# Patient Record
Sex: MALE | Race: WHITE | Employment: FULL TIME
[De-identification: names, ages, dates, MRNs, and addresses within clinical notes are randomized per-mention and may not be internally consistent; named-entity substitution may affect disease eponyms.]

---

## 2017-01-03 ENCOUNTER — OFFICE VISIT (OUTPATIENT)
Facility: CLINIC | Age: 52
End: 2017-01-03

## 2017-01-03 VITALS
DIASTOLIC BLOOD PRESSURE: 76 MMHG | HEIGHT: 72 IN | TEMPERATURE: 98.1 F | OXYGEN SATURATION: 98 % | SYSTOLIC BLOOD PRESSURE: 110 MMHG | BODY MASS INDEX: 42.66 KG/M2 | WEIGHT: 315 LBS | HEART RATE: 74 BPM

## 2017-01-03 DIAGNOSIS — J32.0 MAXILLARY SINUSITIS, UNSPECIFIED CHRONICITY: Primary | ICD-10-CM

## 2017-01-03 PROCEDURE — 99213 OFFICE O/P EST LOW 20 MIN: CPT | Performed by: PHYSICIAN ASSISTANT

## 2017-01-03 RX ORDER — AZITHROMYCIN 250 MG/1
TABLET, FILM COATED ORAL
Qty: 1 PACKET | Refills: 0 | Status: SHIPPED | OUTPATIENT
Start: 2017-01-03 | End: 2017-01-13

## 2017-01-03 RX ORDER — MOMETASONE FUROATE 50 UG/1
2 SPRAY, METERED NASAL DAILY
Qty: 1 INHALER | Refills: 0 | Status: SHIPPED | OUTPATIENT
Start: 2017-01-03 | End: 2017-07-07

## 2017-01-03 ASSESSMENT — ENCOUNTER SYMPTOMS
CHEST TIGHTNESS: 0
SORE THROAT: 1
DIARRHEA: 0
VOMITING: 0
COUGH: 1
NAUSEA: 0
SINUS PRESSURE: 1
SHORTNESS OF BREATH: 0

## 2017-02-03 DIAGNOSIS — E78.5 DYSLIPIDEMIA: ICD-10-CM

## 2017-02-03 RX ORDER — ATORVASTATIN CALCIUM 40 MG/1
TABLET, FILM COATED ORAL
Qty: 30 TABLET | Refills: 0 | Status: SHIPPED | OUTPATIENT
Start: 2017-02-03 | End: 2017-03-09 | Stop reason: SDUPTHER

## 2017-02-03 RX ORDER — LISINOPRIL AND HYDROCHLOROTHIAZIDE 12.5; 1 MG/1; MG/1
TABLET ORAL
Qty: 30 TABLET | Refills: 0 | Status: SHIPPED | OUTPATIENT
Start: 2017-02-03 | End: 2017-03-09 | Stop reason: SDUPTHER

## 2017-02-03 RX ORDER — PANTOPRAZOLE SODIUM 40 MG/1
TABLET, DELAYED RELEASE ORAL
Qty: 30 TABLET | Refills: 0 | Status: SHIPPED | OUTPATIENT
Start: 2017-02-03 | End: 2017-03-09 | Stop reason: SDUPTHER

## 2017-03-09 ENCOUNTER — TELEPHONE (OUTPATIENT)
Facility: CLINIC | Age: 52
End: 2017-03-09

## 2017-03-09 DIAGNOSIS — L91.8 SKIN TAG: Primary | ICD-10-CM

## 2017-03-09 DIAGNOSIS — E78.5 DYSLIPIDEMIA: ICD-10-CM

## 2017-03-09 RX ORDER — ATORVASTATIN CALCIUM 40 MG/1
TABLET, FILM COATED ORAL
Qty: 30 TABLET | Refills: 0 | Status: SHIPPED | OUTPATIENT
Start: 2017-03-09 | End: 2017-04-14 | Stop reason: SDUPTHER

## 2017-03-09 RX ORDER — LISINOPRIL AND HYDROCHLOROTHIAZIDE 12.5; 1 MG/1; MG/1
TABLET ORAL
Qty: 30 TABLET | Refills: 0 | Status: SHIPPED | OUTPATIENT
Start: 2017-03-09 | End: 2017-04-14 | Stop reason: SDUPTHER

## 2017-03-09 RX ORDER — PANTOPRAZOLE SODIUM 40 MG/1
TABLET, DELAYED RELEASE ORAL
Qty: 30 TABLET | Refills: 0 | Status: SHIPPED | OUTPATIENT
Start: 2017-03-09 | End: 2017-04-14 | Stop reason: SDUPTHER

## 2017-04-14 DIAGNOSIS — E78.5 DYSLIPIDEMIA: ICD-10-CM

## 2017-04-17 RX ORDER — LISINOPRIL AND HYDROCHLOROTHIAZIDE 12.5; 1 MG/1; MG/1
TABLET ORAL
Qty: 30 TABLET | Refills: 0 | Status: SHIPPED | OUTPATIENT
Start: 2017-04-17 | End: 2017-05-18 | Stop reason: SDUPTHER

## 2017-04-17 RX ORDER — ATORVASTATIN CALCIUM 40 MG/1
TABLET, FILM COATED ORAL
Qty: 30 TABLET | Refills: 0 | Status: SHIPPED | OUTPATIENT
Start: 2017-04-17 | End: 2017-05-18 | Stop reason: SDUPTHER

## 2017-04-17 RX ORDER — PANTOPRAZOLE SODIUM 40 MG/1
TABLET, DELAYED RELEASE ORAL
Qty: 30 TABLET | Refills: 0 | Status: SHIPPED | OUTPATIENT
Start: 2017-04-17 | End: 2017-05-18 | Stop reason: SDUPTHER

## 2017-05-18 DIAGNOSIS — E78.5 DYSLIPIDEMIA: ICD-10-CM

## 2017-05-19 RX ORDER — LISINOPRIL AND HYDROCHLOROTHIAZIDE 12.5; 1 MG/1; MG/1
TABLET ORAL
Qty: 30 TABLET | Refills: 0 | Status: SHIPPED | OUTPATIENT
Start: 2017-05-19 | End: 2017-06-23 | Stop reason: SDUPTHER

## 2017-05-19 RX ORDER — ATORVASTATIN CALCIUM 40 MG/1
TABLET, FILM COATED ORAL
Qty: 30 TABLET | Refills: 0 | Status: SHIPPED | OUTPATIENT
Start: 2017-05-19 | End: 2017-06-23 | Stop reason: SDUPTHER

## 2017-05-19 RX ORDER — PANTOPRAZOLE SODIUM 40 MG/1
TABLET, DELAYED RELEASE ORAL
Qty: 30 TABLET | Refills: 0 | Status: SHIPPED | OUTPATIENT
Start: 2017-05-19 | End: 2017-06-23 | Stop reason: SDUPTHER

## 2017-08-04 ENCOUNTER — OFFICE VISIT (OUTPATIENT)
Dept: PODIATRY | Age: 52
End: 2017-08-04
Payer: COMMERCIAL

## 2017-08-04 ENCOUNTER — HOSPITAL ENCOUNTER (OUTPATIENT)
Age: 52
Setting detail: SPECIMEN
Discharge: HOME | End: 2017-08-04
Payer: COMMERCIAL

## 2017-08-04 VITALS
SYSTOLIC BLOOD PRESSURE: 120 MMHG | HEIGHT: 73 IN | HEART RATE: 65 BPM | DIASTOLIC BLOOD PRESSURE: 76 MMHG | WEIGHT: 300 LBS | BODY MASS INDEX: 39.76 KG/M2

## 2017-08-04 DIAGNOSIS — R73.9 ELEVATED BLOOD SUGAR: ICD-10-CM

## 2017-08-04 DIAGNOSIS — M21.70 LOWER LIMB LENGTH DIFFERENCE: ICD-10-CM

## 2017-08-04 DIAGNOSIS — M79.672 LEFT FOOT PAIN: ICD-10-CM

## 2017-08-04 DIAGNOSIS — E55.9 VITAMIN D DEFICIENCY: ICD-10-CM

## 2017-08-04 DIAGNOSIS — Z12.5 PROSTATE CANCER SCREENING: ICD-10-CM

## 2017-08-04 DIAGNOSIS — E78.5 DYSLIPIDEMIA: ICD-10-CM

## 2017-08-04 DIAGNOSIS — M19.072 PRIMARY OSTEOARTHRITIS OF LEFT FOOT: Primary | ICD-10-CM

## 2017-08-04 LAB
ALBUMIN SERPL-MCNC: 4.2 G/DL (ref 3.5–5.2)
ALBUMIN/GLOBULIN RATIO: 1.4 (ref 1–2.5)
ALP BLD-CCNC: 100 U/L (ref 40–129)
ALT SERPL-CCNC: 17 U/L (ref 5–41)
ANION GAP SERPL CALCULATED.3IONS-SCNC: 16 MMOL/L (ref 9–17)
AST SERPL-CCNC: 24 U/L
BILIRUB SERPL-MCNC: 0.48 MG/DL (ref 0.3–1.2)
BUN BLDV-MCNC: 16 MG/DL (ref 6–20)
BUN/CREAT BLD: ABNORMAL (ref 9–20)
CALCIUM SERPL-MCNC: 9 MG/DL (ref 8.6–10.4)
CHLORIDE BLD-SCNC: 104 MMOL/L (ref 98–107)
CHOLESTEROL/HDL RATIO: 5
CHOLESTEROL: 205 MG/DL
CO2: 25 MMOL/L (ref 20–31)
CREAT SERPL-MCNC: 0.82 MG/DL (ref 0.7–1.2)
ESTIMATED AVERAGE GLUCOSE: 123 MG/DL
GFR AFRICAN AMERICAN: >60 ML/MIN
GFR NON-AFRICAN AMERICAN: >60 ML/MIN
GFR SERPL CREATININE-BSD FRML MDRD: ABNORMAL ML/MIN/{1.73_M2}
GFR SERPL CREATININE-BSD FRML MDRD: ABNORMAL ML/MIN/{1.73_M2}
GLUCOSE BLD-MCNC: 109 MG/DL (ref 70–99)
HBA1C MFR BLD: 5.9 % (ref 4–6)
HCT VFR BLD CALC: 46.2 % (ref 41–53)
HDLC SERPL-MCNC: 41 MG/DL
HEMOGLOBIN: 15.7 G/DL (ref 13.5–17.5)
LDL CHOLESTEROL: 135 MG/DL (ref 0–130)
MCH RBC QN AUTO: 30.6 PG (ref 26–34)
MCHC RBC AUTO-ENTMCNC: 34 G/DL (ref 31–37)
MCV RBC AUTO: 90.1 FL (ref 80–100)
PDW BLD-RTO: 14.2 % (ref 12.5–15.4)
PLATELET # BLD: 272 K/UL (ref 140–450)
PMV BLD AUTO: 9.3 FL (ref 6–12)
POTASSIUM SERPL-SCNC: 4.4 MMOL/L (ref 3.7–5.3)
PROSTATE SPECIFIC ANTIGEN: 1.16 UG/L
RBC # BLD: 5.13 M/UL (ref 4.5–5.9)
SODIUM BLD-SCNC: 145 MMOL/L (ref 135–144)
TOTAL PROTEIN: 7.2 G/DL (ref 6.4–8.3)
TRIGL SERPL-MCNC: 144 MG/DL
VITAMIN D 25-HYDROXY: 18.3 NG/ML (ref 30–100)
VLDLC SERPL CALC-MCNC: ABNORMAL MG/DL (ref 1–30)
WBC # BLD: 6.5 K/UL (ref 3.5–11)

## 2017-08-04 PROCEDURE — 99203 OFFICE O/P NEW LOW 30 MIN: CPT | Performed by: PODIATRIST

## 2017-08-04 PROCEDURE — 73630 X-RAY EXAM OF FOOT: CPT | Performed by: PODIATRIST

## 2017-08-04 ASSESSMENT — ENCOUNTER SYMPTOMS
COLOR CHANGE: 0
BACK PAIN: 0
DIARRHEA: 0
SHORTNESS OF BREATH: 0
NAUSEA: 0

## 2017-08-25 ENCOUNTER — HOSPITAL ENCOUNTER (OUTPATIENT)
Age: 52
Setting detail: OUTPATIENT SURGERY
Discharge: HOME | End: 2017-08-25
Attending: SURGERY | Admitting: SURGERY
Payer: COMMERCIAL

## 2017-08-25 VITALS
SYSTOLIC BLOOD PRESSURE: 130 MMHG | BODY MASS INDEX: 39.76 KG/M2 | RESPIRATION RATE: 16 BRPM | HEIGHT: 73 IN | OXYGEN SATURATION: 100 % | WEIGHT: 300 LBS | TEMPERATURE: 97.6 F | HEART RATE: 58 BPM | DIASTOLIC BLOOD PRESSURE: 80 MMHG

## 2017-08-25 PROCEDURE — 7100000011 HC PHASE II RECOVERY - ADDTL 15 MIN: Performed by: SURGERY

## 2017-08-25 PROCEDURE — 6360000002 HC RX W HCPCS: Performed by: SURGERY

## 2017-08-25 PROCEDURE — 99152 MOD SED SAME PHYS/QHP 5/>YRS: CPT | Performed by: SURGERY

## 2017-08-25 PROCEDURE — C1773 RET DEV, INSERTABLE: HCPCS | Performed by: SURGERY

## 2017-08-25 PROCEDURE — 3609010600 HC COLONOSCOPY POLYPECTOMY SNARE/COLD BIOPSY: Performed by: SURGERY

## 2017-08-25 PROCEDURE — 88305 TISSUE EXAM BY PATHOLOGIST: CPT

## 2017-08-25 PROCEDURE — 99153 MOD SED SAME PHYS/QHP EA: CPT | Performed by: SURGERY

## 2017-08-25 PROCEDURE — 2580000003 HC RX 258: Performed by: SURGERY

## 2017-08-25 PROCEDURE — 7100000010 HC PHASE II RECOVERY - FIRST 15 MIN: Performed by: SURGERY

## 2017-08-25 RX ORDER — FENTANYL CITRATE 50 UG/ML
INJECTION, SOLUTION INTRAMUSCULAR; INTRAVENOUS PRN
Status: DISCONTINUED | OUTPATIENT
Start: 2017-08-25 | End: 2017-08-25 | Stop reason: HOSPADM

## 2017-08-25 RX ORDER — MIDAZOLAM HYDROCHLORIDE 1 MG/ML
INJECTION INTRAMUSCULAR; INTRAVENOUS PRN
Status: DISCONTINUED | OUTPATIENT
Start: 2017-08-25 | End: 2017-08-25 | Stop reason: HOSPADM

## 2017-08-25 RX ORDER — SODIUM CHLORIDE, SODIUM LACTATE, POTASSIUM CHLORIDE, CALCIUM CHLORIDE 600; 310; 30; 20 MG/100ML; MG/100ML; MG/100ML; MG/100ML
INJECTION, SOLUTION INTRAVENOUS CONTINUOUS
Status: DISCONTINUED | OUTPATIENT
Start: 2017-08-25 | End: 2017-08-25 | Stop reason: HOSPADM

## 2017-08-25 RX ADMIN — SODIUM CHLORIDE, POTASSIUM CHLORIDE, SODIUM LACTATE AND CALCIUM CHLORIDE: 600; 310; 30; 20 INJECTION, SOLUTION INTRAVENOUS at 08:22

## 2017-08-25 ASSESSMENT — PAIN SCALES - GENERAL
PAINLEVEL_OUTOF10: 0
PAINLEVEL_OUTOF10: 0

## 2017-08-25 ASSESSMENT — PAIN - FUNCTIONAL ASSESSMENT: PAIN_FUNCTIONAL_ASSESSMENT: 0-10

## 2017-08-28 LAB — SURGICAL PATHOLOGY REPORT: NORMAL

## 2018-06-29 ENCOUNTER — HOSPITAL ENCOUNTER (OUTPATIENT)
Facility: CLINIC | Age: 53
Discharge: HOME | End: 2018-07-01
Payer: COMMERCIAL

## 2018-06-29 ENCOUNTER — HOSPITAL ENCOUNTER (OUTPATIENT)
Dept: GENERAL RADIOLOGY | Facility: CLINIC | Age: 53
Discharge: HOME | End: 2018-07-01
Payer: COMMERCIAL

## 2018-06-29 DIAGNOSIS — J40 BRONCHITIS: ICD-10-CM

## 2018-06-29 PROCEDURE — 71046 X-RAY EXAM CHEST 2 VIEWS: CPT

## 2018-12-07 ENCOUNTER — HOSPITAL ENCOUNTER (OUTPATIENT)
Age: 53
Setting detail: SPECIMEN
Discharge: HOME | End: 2018-12-07
Payer: COMMERCIAL

## 2018-12-07 DIAGNOSIS — Z12.5 PROSTATE CANCER SCREENING: ICD-10-CM

## 2018-12-07 DIAGNOSIS — I10 ESSENTIAL HYPERTENSION: ICD-10-CM

## 2018-12-07 DIAGNOSIS — E78.00 PURE HYPERCHOLESTEROLEMIA: ICD-10-CM

## 2018-12-07 DIAGNOSIS — E66.01 OBESITY, CLASS III, BMI 40-49.9 (MORBID OBESITY) (HCC): ICD-10-CM

## 2018-12-07 DIAGNOSIS — F41.9 ANXIETY: ICD-10-CM

## 2018-12-07 LAB
ABSOLUTE EOS #: 0.19 K/UL (ref 0–0.44)
ABSOLUTE IMMATURE GRANULOCYTE: 0.03 K/UL (ref 0–0.3)
ABSOLUTE LYMPH #: 2.23 K/UL (ref 1.1–3.7)
ABSOLUTE MONO #: 0.69 K/UL (ref 0.1–1.2)
ALBUMIN SERPL-MCNC: 3.8 G/DL (ref 3.5–5.2)
ALBUMIN/GLOBULIN RATIO: 1.3 (ref 1–2.5)
ALP BLD-CCNC: 103 U/L (ref 40–129)
ALT SERPL-CCNC: 28 U/L (ref 5–41)
ANION GAP SERPL CALCULATED.3IONS-SCNC: 10 MMOL/L (ref 9–17)
AST SERPL-CCNC: 28 U/L
BASOPHILS # BLD: 1 % (ref 0–2)
BASOPHILS ABSOLUTE: 0.04 K/UL (ref 0–0.2)
BILIRUB SERPL-MCNC: 0.33 MG/DL (ref 0.3–1.2)
BUN BLDV-MCNC: 23 MG/DL (ref 6–20)
BUN/CREAT BLD: ABNORMAL (ref 9–20)
CALCIUM SERPL-MCNC: 8.4 MG/DL (ref 8.6–10.4)
CHLORIDE BLD-SCNC: 108 MMOL/L (ref 98–107)
CHOLESTEROL/HDL RATIO: 3
CHOLESTEROL: 142 MG/DL
CO2: 24 MMOL/L (ref 20–31)
CREAT SERPL-MCNC: 0.82 MG/DL (ref 0.7–1.2)
DIFFERENTIAL TYPE: NORMAL
EOSINOPHILS RELATIVE PERCENT: 3 % (ref 1–4)
GFR AFRICAN AMERICAN: >60 ML/MIN
GFR NON-AFRICAN AMERICAN: >60 ML/MIN
GFR SERPL CREATININE-BSD FRML MDRD: ABNORMAL ML/MIN/{1.73_M2}
GFR SERPL CREATININE-BSD FRML MDRD: ABNORMAL ML/MIN/{1.73_M2}
GLUCOSE BLD-MCNC: 119 MG/DL (ref 70–99)
HCT VFR BLD CALC: 46.3 % (ref 40.7–50.3)
HDLC SERPL-MCNC: 48 MG/DL
HEMOGLOBIN: 15.2 G/DL (ref 13–17)
IMMATURE GRANULOCYTES: 0 %
LDL CHOLESTEROL: 80 MG/DL (ref 0–130)
LYMPHOCYTES # BLD: 32 % (ref 24–43)
MCH RBC QN AUTO: 30.7 PG (ref 25.2–33.5)
MCHC RBC AUTO-ENTMCNC: 32.8 G/DL (ref 28.4–34.8)
MCV RBC AUTO: 93.5 FL (ref 82.6–102.9)
MONOCYTES # BLD: 10 % (ref 3–12)
NRBC AUTOMATED: 0 PER 100 WBC
PDW BLD-RTO: 13.3 % (ref 11.8–14.4)
PLATELET # BLD: 311 K/UL (ref 138–453)
PLATELET ESTIMATE: NORMAL
PMV BLD AUTO: 10.8 FL (ref 8.1–13.5)
POTASSIUM SERPL-SCNC: 4.3 MMOL/L (ref 3.7–5.3)
PROSTATE SPECIFIC ANTIGEN: 0.9 UG/L
RBC # BLD: 4.95 M/UL (ref 4.21–5.77)
RBC # BLD: NORMAL 10*6/UL
SEG NEUTROPHILS: 54 % (ref 36–65)
SEGMENTED NEUTROPHILS ABSOLUTE COUNT: 3.9 K/UL (ref 1.5–8.1)
SODIUM BLD-SCNC: 142 MMOL/L (ref 135–144)
TOTAL PROTEIN: 6.7 G/DL (ref 6.4–8.3)
TRIGL SERPL-MCNC: 72 MG/DL
VLDLC SERPL CALC-MCNC: NORMAL MG/DL (ref 1–30)
WBC # BLD: 7.1 K/UL (ref 3.5–11.3)
WBC # BLD: NORMAL 10*3/UL

## 2018-12-28 ENCOUNTER — OFFICE VISIT (OUTPATIENT)
Dept: FAMILY MEDICINE CLINIC | Age: 53
End: 2018-12-28
Payer: COMMERCIAL

## 2018-12-28 VITALS
TEMPERATURE: 98.3 F | DIASTOLIC BLOOD PRESSURE: 82 MMHG | SYSTOLIC BLOOD PRESSURE: 138 MMHG | HEIGHT: 71 IN | HEART RATE: 77 BPM | BODY MASS INDEX: 44.1 KG/M2 | RESPIRATION RATE: 16 BRPM | OXYGEN SATURATION: 95 % | WEIGHT: 315 LBS

## 2018-12-28 DIAGNOSIS — R09.89 CHEST CONGESTION: Primary | ICD-10-CM

## 2018-12-28 DIAGNOSIS — J01.00 ACUTE NON-RECURRENT MAXILLARY SINUSITIS: ICD-10-CM

## 2018-12-28 PROCEDURE — 99213 OFFICE O/P EST LOW 20 MIN: CPT | Performed by: FAMILY MEDICINE

## 2018-12-28 RX ORDER — PREDNISONE 50 MG/1
50 TABLET ORAL DAILY
Qty: 7 TABLET | Refills: 0 | Status: SHIPPED | OUTPATIENT
Start: 2018-12-28 | End: 2019-01-04

## 2018-12-28 RX ORDER — AZITHROMYCIN 250 MG/1
TABLET, FILM COATED ORAL
Qty: 1 PACKET | Refills: 0 | Status: SHIPPED | OUTPATIENT
Start: 2018-12-28 | End: 2019-11-05 | Stop reason: CLARIF

## 2018-12-28 RX ORDER — BROMPHENIRAMINE MALEATE, PSEUDOEPHEDRINE HYDROCHLORIDE, AND DEXTROMETHORPHAN HYDROBROMIDE 2; 30; 10 MG/5ML; MG/5ML; MG/5ML
5 SYRUP ORAL 4 TIMES DAILY PRN
Qty: 180 ML | Refills: 2 | Status: SHIPPED | OUTPATIENT
Start: 2018-12-28 | End: 2019-11-05 | Stop reason: CLARIF

## 2018-12-28 ASSESSMENT — ENCOUNTER SYMPTOMS
ALLERGIC/IMMUNOLOGIC NEGATIVE: 1
SINUS PRESSURE: 1
SINUS PAIN: 1
GASTROINTESTINAL NEGATIVE: 1
WHEEZING: 1
EYES NEGATIVE: 1
COUGH: 1
RHINORRHEA: 1

## 2018-12-28 NOTE — PROGRESS NOTES
[id number] WALK-IN FAMILY [facility]ICINE   [address] [facility]  [address] [facility] [address]  Dept: [phone]  Dept Fax: [phone]    Shaji Mauricio is a 48 y.o. male who presents today for his medical conditions/complaintsas noted below. Shaji Mauricio is c/o of   Chief Complaint   Patient presents with    Pharyngitis    Headache     x 1.5 weeks    Otalgia         HPI:     Patient is a 63-year-old white male who presents today for cough, congestion, and upper respiratory tract symptoms. Patient also complains of occasional headache with bilateral ear pain and discomfort. Patient also feels pressure between his ears and behind eyes. He denies any fevers or chills at this time. Patient also has heavy wheezing along with sore throat. Patient's symptoms has been going on for the past 10-15 days. We will evaluate and treat.         Hemoglobin A1C (%)   Date Value   08/04/2017 5.9   02/03/2013 5.7             ( goal A1Cis < 7)   No results found for: LABMICR  LDL Cholesterol (mg/dL)   Date Value   12/07/2018 80   08/04/2017 135 (H)   10/23/2015 167 (H)       (goal LDL is <100)   AST (U/L)   Date Value   12/07/2018 28     ALT (U/L)   Date Value   12/07/2018 28     BUN (mg/dL)   Date Value   12/07/2018 23 (H)     BP Readings from Last 3 Encounters:   12/28/18 (!) 147/85   12/04/18 122/80   10/19/18 130/78          (goal 120/80)    Past Medical History:   Diagnosis Date    Anxiety 6/3/2015    GERD (gastroesophageal reflux disease) 6/3/2015    Hyperlipidemia     Hypertension     Obesity, Class III, BMI 40-49.9 (morbid obesity) ([facility] [address] 6/3/2015    Obstructive sleep apnea     uses cpap at home      Past Surgical History:   Procedure Laterality Date    APPENDECTOMY      COLONOSCOPY  08/25/2017    multiple polypectomy    [place] COLSC FLX W/RMVL OF TUMOR POLYP LESION SNARE TQ N/A 8/25/2017    COLONOSCOPY POLYPECTOMY SNARE/COLD BIOPSY performed by Carmen Thornton MD at [address]

## 2020-01-24 ENCOUNTER — HOSPITAL ENCOUNTER (OUTPATIENT)
Dept: GENERAL RADIOLOGY | Facility: CLINIC | Age: 55
Discharge: HOME | End: 2020-01-26
Payer: COMMERCIAL

## 2020-01-24 ENCOUNTER — HOSPITAL ENCOUNTER (OUTPATIENT)
Facility: CLINIC | Age: 55
Discharge: HOME | End: 2020-01-26
Payer: COMMERCIAL

## 2020-01-24 PROCEDURE — 71046 X-RAY EXAM CHEST 2 VIEWS: CPT

## 2022-06-08 ENCOUNTER — HOSPITAL ENCOUNTER (OUTPATIENT)
Age: 57
Setting detail: SPECIMEN
Discharge: HOME | End: 2022-06-08

## 2022-06-08 DIAGNOSIS — F41.9 ANXIETY: ICD-10-CM

## 2022-06-08 DIAGNOSIS — E55.9 VITAMIN D DEFICIENCY: ICD-10-CM

## 2022-06-08 DIAGNOSIS — Z12.5 PROSTATE CANCER SCREENING: ICD-10-CM

## 2022-06-08 DIAGNOSIS — Z72.0 TOBACCO USE: ICD-10-CM

## 2022-06-08 DIAGNOSIS — R73.01 ELEVATED FASTING GLUCOSE: ICD-10-CM

## 2022-06-08 DIAGNOSIS — E78.5 DYSLIPIDEMIA: ICD-10-CM

## 2022-06-08 DIAGNOSIS — I10 ESSENTIAL HYPERTENSION: ICD-10-CM

## 2022-06-08 DIAGNOSIS — E66.01 OBESITY, CLASS III, BMI 40-49.9 (MORBID OBESITY) (HCC): ICD-10-CM

## 2022-06-08 DIAGNOSIS — K21.9 GASTROESOPHAGEAL REFLUX DISEASE WITHOUT ESOPHAGITIS: ICD-10-CM

## 2022-06-08 LAB
ALBUMIN SERPL-MCNC: 4.3 G/DL (ref 3.5–5.2)
ALBUMIN/GLOBULIN RATIO: 1.4 (ref 1–2.5)
ALP BLD-CCNC: 147 U/L (ref 40–129)
ALT SERPL-CCNC: 19 U/L (ref 5–41)
ANION GAP SERPL CALCULATED.3IONS-SCNC: 13 MMOL/L (ref 9–17)
AST SERPL-CCNC: 21 U/L
BILIRUB SERPL-MCNC: 0.62 MG/DL (ref 0.3–1.2)
BUN BLDV-MCNC: 11 MG/DL (ref 6–20)
CALCIUM SERPL-MCNC: 9.5 MG/DL (ref 8.6–10.4)
CHLORIDE BLD-SCNC: 98 MMOL/L (ref 98–107)
CHOLESTEROL, FASTING: 177 MG/DL
CHOLESTEROL/HDL RATIO: 4.2
CO2: 24 MMOL/L (ref 20–31)
CREAT SERPL-MCNC: 0.66 MG/DL (ref 0.7–1.2)
GFR AFRICAN AMERICAN: >60 ML/MIN
GFR NON-AFRICAN AMERICAN: >60 ML/MIN
GFR SERPL CREATININE-BSD FRML MDRD: ABNORMAL ML/MIN/{1.73_M2}
GLUCOSE BLD-MCNC: 323 MG/DL (ref 70–99)
HDLC SERPL-MCNC: 42 MG/DL
LDL CHOLESTEROL: 103 MG/DL (ref 0–130)
POTASSIUM SERPL-SCNC: 4.5 MMOL/L (ref 3.7–5.3)
PROSTATE SPECIFIC ANTIGEN: 0.84 NG/ML
SODIUM BLD-SCNC: 135 MMOL/L (ref 135–144)
TOTAL PROTEIN: 7.3 G/DL (ref 6.4–8.3)
TRIGLYCERIDE, FASTING: 161 MG/DL
VITAMIN D 25-HYDROXY: 17.4 NG/ML

## 2023-10-17 ENCOUNTER — HOSPITAL ENCOUNTER (OUTPATIENT)
Age: 58
Setting detail: SPECIMEN
Discharge: HOME | End: 2023-10-17

## 2023-10-17 DIAGNOSIS — E55.9 VITAMIN D DEFICIENCY: ICD-10-CM

## 2023-10-17 DIAGNOSIS — E11.9 TYPE 2 DIABETES MELLITUS WITHOUT COMPLICATION, WITHOUT LONG-TERM CURRENT USE OF INSULIN (HCC): ICD-10-CM

## 2023-10-17 LAB
BASOPHILS # BLD: 0.05 K/UL (ref 0–0.2)
BASOPHILS NFR BLD: 1 % (ref 0–2)
EOSINOPHIL # BLD: 0.24 K/UL (ref 0–0.44)
EOSINOPHILS RELATIVE PERCENT: 2 % (ref 1–4)
ERYTHROCYTE [DISTWIDTH] IN BLOOD BY AUTOMATED COUNT: 13.6 % (ref 11.8–14.4)
HCT VFR BLD AUTO: 50.5 % (ref 40.7–50.3)
HGB BLD-MCNC: 16.5 G/DL (ref 13–17)
IMM GRANULOCYTES # BLD AUTO: 0.04 K/UL (ref 0–0.3)
IMM GRANULOCYTES NFR BLD: 0 %
LYMPHOCYTES NFR BLD: 2.99 K/UL (ref 1.1–3.7)
LYMPHOCYTES RELATIVE PERCENT: 30 % (ref 24–43)
MCH RBC QN AUTO: 30.2 PG (ref 25.2–33.5)
MCHC RBC AUTO-ENTMCNC: 32.7 G/DL (ref 28.4–34.8)
MCV RBC AUTO: 92.5 FL (ref 82.6–102.9)
MONOCYTES NFR BLD: 0.95 K/UL (ref 0.1–1.2)
MONOCYTES NFR BLD: 9 % (ref 3–12)
NEUTROPHILS NFR BLD: 58 % (ref 36–65)
NEUTS SEG NFR BLD: 5.8 K/UL (ref 1.5–8.1)
NRBC BLD-RTO: 0 PER 100 WBC
PLATELET # BLD AUTO: 321 K/UL (ref 138–453)
PMV BLD AUTO: 11.9 FL (ref 8.1–13.5)
RBC # BLD AUTO: 5.46 M/UL (ref 4.21–5.77)
WBC OTHER # BLD: 10.1 K/UL (ref 3.5–11.3)

## 2023-10-18 LAB
25(OH)D3 SERPL-MCNC: 33 NG/ML
ALBUMIN SERPL-MCNC: 4 G/DL (ref 3.5–5.2)
ALBUMIN/GLOB SERPL: 1.4 {RATIO} (ref 1–2.5)
ALP SERPL-CCNC: 104 U/L (ref 40–129)
ALT SERPL-CCNC: 12 U/L (ref 5–41)
ANION GAP SERPL CALCULATED.3IONS-SCNC: 13 MMOL/L (ref 9–17)
AST SERPL-CCNC: 21 U/L
BILIRUB SERPL-MCNC: 0.4 MG/DL (ref 0.3–1.2)
BUN SERPL-MCNC: 17 MG/DL (ref 6–20)
CALCIUM SERPL-MCNC: 9 MG/DL (ref 8.6–10.4)
CHLORIDE SERPL-SCNC: 102 MMOL/L (ref 98–107)
CO2 SERPL-SCNC: 24 MMOL/L (ref 20–31)
CREAT SERPL-MCNC: 0.8 MG/DL (ref 0.7–1.2)
CREAT UR-MCNC: 46.1 MG/DL (ref 39–259)
GFR SERPL CREATININE-BSD FRML MDRD: >60 ML/MIN/1.73M2
GLUCOSE SERPL-MCNC: 136 MG/DL (ref 70–99)
MICROALBUMIN UR-MCNC: <12 MG/L
MICROALBUMIN/CREAT UR-RTO: NORMAL MCG/MG CREAT
POTASSIUM SERPL-SCNC: 4.3 MMOL/L (ref 3.7–5.3)
PROT SERPL-MCNC: 6.9 G/DL (ref 6.4–8.3)
SODIUM SERPL-SCNC: 139 MMOL/L (ref 135–144)

## 2023-10-24 PROBLEM — L03.90 ACUTE CELLULITIS: Status: ACTIVE | Noted: 2023-10-24

## 2023-10-24 PROBLEM — L23.7 POISON IVY: Status: ACTIVE | Noted: 2023-10-24

## 2023-10-30 ENCOUNTER — OFFICE VISIT (OUTPATIENT)
Dept: FAMILY MEDICINE CLINIC | Age: 58
End: 2023-10-30
Payer: COMMERCIAL

## 2023-10-30 VITALS
SYSTOLIC BLOOD PRESSURE: 129 MMHG | HEART RATE: 71 BPM | OXYGEN SATURATION: 96 % | DIASTOLIC BLOOD PRESSURE: 87 MMHG | TEMPERATURE: 97.6 F

## 2023-10-30 DIAGNOSIS — L23.9 ALLERGIC DERMATITIS: Primary | ICD-10-CM

## 2023-10-30 PROCEDURE — 3074F SYST BP LT 130 MM HG: CPT

## 2023-10-30 PROCEDURE — 96372 THER/PROPH/DIAG INJ SC/IM: CPT

## 2023-10-30 PROCEDURE — 3079F DIAST BP 80-89 MM HG: CPT

## 2023-10-30 PROCEDURE — 99213 OFFICE O/P EST LOW 20 MIN: CPT

## 2023-10-30 RX ORDER — HYDROXYZINE HYDROCHLORIDE 25 MG/1
25 TABLET, FILM COATED ORAL EVERY 8 HOURS PRN
Qty: 30 TABLET | Refills: 0 | Status: SHIPPED | OUTPATIENT
Start: 2023-10-30 | End: 2023-11-09

## 2023-10-30 RX ORDER — TRIAMCINOLONE ACETONIDE 40 MG/ML
40 INJECTION, SUSPENSION INTRA-ARTICULAR; INTRAMUSCULAR ONCE
Status: COMPLETED | OUTPATIENT
Start: 2023-10-30 | End: 2023-10-30

## 2023-10-30 RX ORDER — CLOBETASOL PROPIONATE 0.5 MG/G
CREAM TOPICAL
Qty: 60 G | Refills: 1 | Status: SHIPPED | OUTPATIENT
Start: 2023-10-30 | End: 2023-11-13

## 2023-10-30 RX ORDER — AMOXICILLIN 500 MG/1
CAPSULE ORAL
COMMUNITY
Start: 2023-10-18 | End: 2023-10-30

## 2023-10-30 RX ADMIN — TRIAMCINOLONE ACETONIDE 40 MG: 40 INJECTION, SUSPENSION INTRA-ARTICULAR; INTRAMUSCULAR at 16:44

## 2023-10-30 ASSESSMENT — ENCOUNTER SYMPTOMS
COUGH: 0
EYE ITCHING: 0
EYE PAIN: 0
COLOR CHANGE: 1
SHORTNESS OF BREATH: 0
DIARRHEA: 0
SORE THROAT: 0
EYE DISCHARGE: 0
VOMITING: 0
NAIL CHANGES: 0
RHINORRHEA: 0

## 2024-01-08 PROBLEM — E11.9 TYPE 2 DIABETES MELLITUS WITHOUT COMPLICATION, WITHOUT LONG-TERM CURRENT USE OF INSULIN (HCC): Status: ACTIVE | Noted: 2024-01-08

## 2024-01-08 PROBLEM — E11.9 TYPE 2 DIABETES MELLITUS (HCC): Status: ACTIVE | Noted: 2024-01-08

## 2024-01-09 ENCOUNTER — HOSPITAL ENCOUNTER (OUTPATIENT)
Age: 59
Discharge: HOME | End: 2024-01-11
Payer: COMMERCIAL

## 2024-01-09 ENCOUNTER — HOSPITAL ENCOUNTER (OUTPATIENT)
Dept: GENERAL RADIOLOGY | Age: 59
Discharge: HOME | End: 2024-01-11
Payer: COMMERCIAL

## 2024-01-09 ENCOUNTER — HOSPITAL ENCOUNTER (OUTPATIENT)
Age: 59
Discharge: HOME | End: 2024-01-09
Payer: COMMERCIAL

## 2024-01-09 DIAGNOSIS — R07.9 CHEST PAIN, UNSPECIFIED TYPE: ICD-10-CM

## 2024-01-09 LAB
ANION GAP SERPL CALCULATED.3IONS-SCNC: 13 MMOL/L (ref 9–17)
BUN SERPL-MCNC: 21 MG/DL (ref 6–20)
CALCIUM SERPL-MCNC: 8.8 MG/DL (ref 8.6–10.4)
CHLORIDE SERPL-SCNC: 102 MMOL/L (ref 98–107)
CO2 SERPL-SCNC: 24 MMOL/L (ref 20–31)
CREAT SERPL-MCNC: 0.7 MG/DL (ref 0.7–1.2)
D DIMER PPP FEU-MCNC: 0.36 UG/ML FEU (ref 0–0.59)
ERYTHROCYTE [DISTWIDTH] IN BLOOD BY AUTOMATED COUNT: 13.9 % (ref 11.5–14.9)
GFR SERPL CREATININE-BSD FRML MDRD: >60 ML/MIN/1.73M2
GLUCOSE SERPL-MCNC: 195 MG/DL (ref 70–99)
HCT VFR BLD AUTO: 50.3 % (ref 41–53)
HGB BLD-MCNC: 16.8 G/DL (ref 13.5–17.5)
MCH RBC QN AUTO: 30.9 PG (ref 26–34)
MCHC RBC AUTO-ENTMCNC: 33.5 G/DL (ref 31–37)
MCV RBC AUTO: 92.2 FL (ref 80–100)
PLATELET # BLD AUTO: 317 K/UL (ref 150–450)
PMV BLD AUTO: 9.4 FL (ref 6–12)
POTASSIUM SERPL-SCNC: 4.1 MMOL/L (ref 3.7–5.3)
RBC # BLD AUTO: 5.45 M/UL (ref 4.5–5.9)
SODIUM SERPL-SCNC: 139 MMOL/L (ref 135–144)
TROPONIN I SERPL HS-MCNC: 19 NG/L (ref 0–22)
WBC OTHER # BLD: 7.4 K/UL (ref 3.5–11)

## 2024-01-09 PROCEDURE — 80048 BASIC METABOLIC PNL TOTAL CA: CPT

## 2024-01-09 PROCEDURE — 93005 ELECTROCARDIOGRAM TRACING: CPT

## 2024-01-09 PROCEDURE — 85027 COMPLETE CBC AUTOMATED: CPT

## 2024-01-09 PROCEDURE — 36415 COLL VENOUS BLD VENIPUNCTURE: CPT

## 2024-01-09 PROCEDURE — 71046 X-RAY EXAM CHEST 2 VIEWS: CPT

## 2024-01-09 PROCEDURE — 85379 FIBRIN DEGRADATION QUANT: CPT

## 2024-01-09 PROCEDURE — 84484 ASSAY OF TROPONIN QUANT: CPT

## 2024-01-10 LAB
EKG ATRIAL RATE: 63 BPM
EKG P AXIS: 37 DEGREES
EKG P-R INTERVAL: 186 MS
EKG Q-T INTERVAL: 412 MS
EKG QRS DURATION: 86 MS
EKG QTC CALCULATION (BAZETT): 421 MS
EKG R AXIS: 24 DEGREES
EKG T AXIS: 56 DEGREES
EKG VENTRICULAR RATE: 63 BPM

## 2024-03-01 ENCOUNTER — HOSPITAL ENCOUNTER (OUTPATIENT)
Age: 59
Setting detail: OBSERVATION
End: 2024-03-01
Payer: COMMERCIAL

## 2024-03-01 ENCOUNTER — APPOINTMENT (OUTPATIENT)
Dept: GENERAL RADIOLOGY | Age: 59
End: 2024-03-01
Attending: EMERGENCY MEDICINE
Payer: COMMERCIAL

## 2024-03-01 ENCOUNTER — HOSPITAL ENCOUNTER (OUTPATIENT)
Age: 59
Setting detail: OBSERVATION
Discharge: HOME | End: 2024-03-01
Attending: EMERGENCY MEDICINE | Admitting: FAMILY MEDICINE
Payer: COMMERCIAL

## 2024-03-01 ENCOUNTER — APPOINTMENT (OUTPATIENT)
Dept: NUCLEAR MEDICINE | Age: 59
End: 2024-03-01
Payer: COMMERCIAL

## 2024-03-01 VITALS
HEART RATE: 75 BPM | OXYGEN SATURATION: 98 % | TEMPERATURE: 97.7 F | WEIGHT: 300 LBS | DIASTOLIC BLOOD PRESSURE: 84 MMHG | SYSTOLIC BLOOD PRESSURE: 123 MMHG | RESPIRATION RATE: 18 BRPM | BODY MASS INDEX: 40.63 KG/M2 | HEIGHT: 72 IN

## 2024-03-01 DIAGNOSIS — R07.9 CHEST PAIN, UNSPECIFIED TYPE: Primary | ICD-10-CM

## 2024-03-01 LAB
ALBUMIN SERPL-MCNC: 4.1 G/DL (ref 3.5–5.2)
ALP SERPL-CCNC: 111 U/L (ref 40–129)
ALT SERPL-CCNC: 15 U/L (ref 5–41)
ANION GAP SERPL CALCULATED.3IONS-SCNC: 14 MMOL/L (ref 9–17)
AST SERPL-CCNC: 19 U/L
BASOPHILS # BLD: 0.1 K/UL (ref 0–0.2)
BASOPHILS NFR BLD: 1 % (ref 0–2)
BILIRUB SERPL-MCNC: 0.6 MG/DL (ref 0.3–1.2)
BUN SERPL-MCNC: 15 MG/DL (ref 6–20)
CALCIUM SERPL-MCNC: 9 MG/DL (ref 8.6–10.4)
CHLORIDE SERPL-SCNC: 102 MMOL/L (ref 98–107)
CO2 SERPL-SCNC: 23 MMOL/L (ref 20–31)
CREAT SERPL-MCNC: 0.8 MG/DL (ref 0.7–1.2)
D DIMER PPP FEU-MCNC: 0.42 UG/ML FEU (ref 0–0.59)
ECHO BSA: 2.63 M2
EKG ATRIAL RATE: 73 BPM
EKG P AXIS: 29 DEGREES
EKG P-R INTERVAL: 178 MS
EKG Q-T INTERVAL: 394 MS
EKG QRS DURATION: 84 MS
EKG QTC CALCULATION (BAZETT): 434 MS
EKG R AXIS: -7 DEGREES
EKG T AXIS: 30 DEGREES
EKG VENTRICULAR RATE: 73 BPM
EOSINOPHIL # BLD: 0.2 K/UL (ref 0–0.4)
EOSINOPHILS RELATIVE PERCENT: 3 % (ref 0–4)
ERYTHROCYTE [DISTWIDTH] IN BLOOD BY AUTOMATED COUNT: 14 % (ref 11.5–14.9)
GFR SERPL CREATININE-BSD FRML MDRD: >60 ML/MIN/1.73M2
GLUCOSE BLD-MCNC: 109 MG/DL (ref 75–110)
GLUCOSE SERPL-MCNC: 175 MG/DL (ref 70–99)
HCT VFR BLD AUTO: 49.6 % (ref 41–53)
HGB BLD-MCNC: 16.8 G/DL (ref 13.5–17.5)
INR PPP: 1
LYMPHOCYTES NFR BLD: 2.1 K/UL (ref 1–4.8)
LYMPHOCYTES RELATIVE PERCENT: 26 % (ref 24–44)
MAGNESIUM SERPL-MCNC: 2.2 MG/DL (ref 1.6–2.6)
MCH RBC QN AUTO: 31.1 PG (ref 26–34)
MCHC RBC AUTO-ENTMCNC: 33.8 G/DL (ref 31–37)
MCV RBC AUTO: 92 FL (ref 80–100)
MONOCYTES NFR BLD: 0.9 K/UL (ref 0.1–1.3)
MONOCYTES NFR BLD: 11 % (ref 1–7)
NEUTROPHILS NFR BLD: 59 % (ref 36–66)
NEUTS SEG NFR BLD: 4.6 K/UL (ref 1.3–9.1)
NUC STRESS EJECTION FRACTION: 59 %
PLATELET # BLD AUTO: 299 K/UL (ref 150–450)
PMV BLD AUTO: 9.4 FL (ref 6–12)
POTASSIUM SERPL-SCNC: 4 MMOL/L (ref 3.7–5.3)
PROT SERPL-MCNC: 7.3 G/DL (ref 6.4–8.3)
PROTHROMBIN TIME: 13.3 SEC (ref 11.8–14.6)
RBC # BLD AUTO: 5.39 M/UL (ref 4.5–5.9)
SODIUM SERPL-SCNC: 139 MMOL/L (ref 135–144)
STRESS BASELINE DIAS BP: 72 MMHG
STRESS BASELINE HR: 64 BPM
STRESS BASELINE SYS BP: 112 MMHG
STRESS ESTIMATED WORKLOAD: 1 METS
STRESS PEAK DIAS BP: 75 MMHG
STRESS PEAK SYS BP: 143 MMHG
STRESS PERCENT HR ACHIEVED: 57 %
STRESS POST PEAK HR: 92 BPM
STRESS RATE PRESSURE PRODUCT: NORMAL BPM*MMHG
STRESS STAGE RECOVERY 1 BP: NORMAL MMHG
STRESS STAGE RECOVERY 1 DURATION: 1 MIN:SEC
STRESS STAGE RECOVERY 1 HR: 92 BPM
STRESS STAGE RECOVERY 2 BP: NORMAL MMHG
STRESS STAGE RECOVERY 2 DURATION: 7 MIN:SEC
STRESS STAGE RECOVERY 2 HR: 67 BPM
STRESS TARGET HR: 162 BPM
TROPONIN I SERPL HS-MCNC: 20 NG/L (ref 0–22)
TROPONIN I SERPL HS-MCNC: 21 NG/L (ref 0–22)
TROPONIN I SERPL HS-MCNC: 22 NG/L (ref 0–22)
WBC OTHER # BLD: 7.9 K/UL (ref 3.5–11)

## 2024-03-01 PROCEDURE — G0378 HOSPITAL OBSERVATION PER HR: HCPCS

## 2024-03-01 PROCEDURE — 99285 EMERGENCY DEPT VISIT HI MDM: CPT

## 2024-03-01 PROCEDURE — 85610 PROTHROMBIN TIME: CPT

## 2024-03-01 PROCEDURE — 82947 ASSAY GLUCOSE BLOOD QUANT: CPT

## 2024-03-01 PROCEDURE — 85025 COMPLETE CBC W/AUTO DIFF WBC: CPT

## 2024-03-01 PROCEDURE — 85379 FIBRIN DEGRADATION QUANT: CPT

## 2024-03-01 PROCEDURE — 83735 ASSAY OF MAGNESIUM: CPT

## 2024-03-01 PROCEDURE — 93010 ELECTROCARDIOGRAM REPORT: CPT | Performed by: INTERNAL MEDICINE

## 2024-03-01 PROCEDURE — 93017 CV STRESS TEST TRACING ONLY: CPT

## 2024-03-01 PROCEDURE — 93005 ELECTROCARDIOGRAM TRACING: CPT | Performed by: EMERGENCY MEDICINE

## 2024-03-01 PROCEDURE — 71045 X-RAY EXAM CHEST 1 VIEW: CPT

## 2024-03-01 PROCEDURE — 78452 HT MUSCLE IMAGE SPECT MULT: CPT

## 2024-03-01 PROCEDURE — 3430000000 HC RX DIAGNOSTIC RADIOPHARMACEUTICAL: Performed by: EMERGENCY MEDICINE

## 2024-03-01 PROCEDURE — 6370000000 HC RX 637 (ALT 250 FOR IP): Performed by: EMERGENCY MEDICINE

## 2024-03-01 PROCEDURE — 6360000002 HC RX W HCPCS: Performed by: EMERGENCY MEDICINE

## 2024-03-01 PROCEDURE — 99239 HOSP IP/OBS DSCHRG MGMT >30: CPT | Performed by: FAMILY MEDICINE

## 2024-03-01 PROCEDURE — 36415 COLL VENOUS BLD VENIPUNCTURE: CPT

## 2024-03-01 PROCEDURE — 84484 ASSAY OF TROPONIN QUANT: CPT

## 2024-03-01 PROCEDURE — 80053 COMPREHEN METABOLIC PANEL: CPT

## 2024-03-01 PROCEDURE — 93005 ELECTROCARDIOGRAM TRACING: CPT | Performed by: FAMILY MEDICINE

## 2024-03-01 PROCEDURE — A9500 TC99M SESTAMIBI: HCPCS | Performed by: EMERGENCY MEDICINE

## 2024-03-01 PROCEDURE — 2580000003 HC RX 258: Performed by: EMERGENCY MEDICINE

## 2024-03-01 RX ORDER — SODIUM CHLORIDE 9 MG/ML
500 INJECTION, SOLUTION INTRAVENOUS CONTINUOUS PRN
Status: ACTIVE | OUTPATIENT
Start: 2024-03-01 | End: 2024-03-01

## 2024-03-01 RX ORDER — SODIUM CHLORIDE 0.9 % (FLUSH) 0.9 %
5-40 SYRINGE (ML) INJECTION PRN
Status: ACTIVE | OUTPATIENT
Start: 2024-03-01 | End: 2024-03-01

## 2024-03-01 RX ORDER — SODIUM CHLORIDE 9 MG/ML
INJECTION, SOLUTION INTRAVENOUS PRN
Status: DISCONTINUED | OUTPATIENT
Start: 2024-03-01 | End: 2024-03-01 | Stop reason: HOSPADM

## 2024-03-01 RX ORDER — ONDANSETRON 4 MG/1
4 TABLET, ORALLY DISINTEGRATING ORAL EVERY 8 HOURS PRN
Status: DISCONTINUED | OUTPATIENT
Start: 2024-03-01 | End: 2024-03-01 | Stop reason: HOSPADM

## 2024-03-01 RX ORDER — ASPIRIN 325 MG
325 TABLET ORAL ONCE
Status: COMPLETED | OUTPATIENT
Start: 2024-03-01 | End: 2024-03-01

## 2024-03-01 RX ORDER — SODIUM CHLORIDE 0.9 % (FLUSH) 0.9 %
10 SYRINGE (ML) INJECTION PRN
Status: DISCONTINUED | OUTPATIENT
Start: 2024-03-01 | End: 2024-03-01 | Stop reason: HOSPADM

## 2024-03-01 RX ORDER — SODIUM CHLORIDE 0.9 % (FLUSH) 0.9 %
5-40 SYRINGE (ML) INJECTION EVERY 12 HOURS SCHEDULED
Status: DISCONTINUED | OUTPATIENT
Start: 2024-03-01 | End: 2024-03-01 | Stop reason: HOSPADM

## 2024-03-01 RX ORDER — DEXTROSE MONOHYDRATE 100 MG/ML
INJECTION, SOLUTION INTRAVENOUS CONTINUOUS PRN
Status: DISCONTINUED | OUTPATIENT
Start: 2024-03-01 | End: 2024-03-01 | Stop reason: HOSPADM

## 2024-03-01 RX ORDER — AMINOPHYLLINE 25 MG/ML
50 INJECTION, SOLUTION INTRAVENOUS PRN
Status: ACTIVE | OUTPATIENT
Start: 2024-03-01 | End: 2024-03-01

## 2024-03-01 RX ORDER — ATORVASTATIN CALCIUM 40 MG/1
40 TABLET, FILM COATED ORAL NIGHTLY
Status: DISCONTINUED | OUTPATIENT
Start: 2024-03-01 | End: 2024-03-01 | Stop reason: HOSPADM

## 2024-03-01 RX ORDER — ALBUTEROL SULFATE 90 UG/1
2 AEROSOL, METERED RESPIRATORY (INHALATION) PRN
Status: ACTIVE | OUTPATIENT
Start: 2024-03-01 | End: 2024-03-01

## 2024-03-01 RX ORDER — ONDANSETRON 2 MG/ML
4 INJECTION INTRAMUSCULAR; INTRAVENOUS EVERY 6 HOURS PRN
Status: DISCONTINUED | OUTPATIENT
Start: 2024-03-01 | End: 2024-03-01 | Stop reason: HOSPADM

## 2024-03-01 RX ORDER — REGADENOSON 0.08 MG/ML
0.4 INJECTION, SOLUTION INTRAVENOUS
Status: COMPLETED | OUTPATIENT
Start: 2024-03-01 | End: 2024-03-01

## 2024-03-01 RX ORDER — TETRAKIS(2-METHOXYISOBUTYLISOCYANIDE)COPPER(I) TETRAFLUOROBORATE 1 MG/ML
17.5 INJECTION, POWDER, LYOPHILIZED, FOR SOLUTION INTRAVENOUS
Status: COMPLETED | OUTPATIENT
Start: 2024-03-01 | End: 2024-03-01

## 2024-03-01 RX ORDER — HYDROCHLOROTHIAZIDE 25 MG/1
12.5 TABLET ORAL DAILY
Status: DISCONTINUED | OUTPATIENT
Start: 2024-03-01 | End: 2024-03-01 | Stop reason: HOSPADM

## 2024-03-01 RX ORDER — POTASSIUM CHLORIDE 7.45 MG/ML
10 INJECTION INTRAVENOUS PRN
Status: DISCONTINUED | OUTPATIENT
Start: 2024-03-01 | End: 2024-03-01 | Stop reason: HOSPADM

## 2024-03-01 RX ORDER — ACETAMINOPHEN 650 MG/1
650 SUPPOSITORY RECTAL EVERY 6 HOURS PRN
Status: DISCONTINUED | OUTPATIENT
Start: 2024-03-01 | End: 2024-03-01 | Stop reason: HOSPADM

## 2024-03-01 RX ORDER — ENOXAPARIN SODIUM 100 MG/ML
30 INJECTION SUBCUTANEOUS 2 TIMES DAILY
Status: DISCONTINUED | OUTPATIENT
Start: 2024-03-01 | End: 2024-03-01 | Stop reason: HOSPADM

## 2024-03-01 RX ORDER — METOPROLOL TARTRATE 1 MG/ML
5 INJECTION, SOLUTION INTRAVENOUS EVERY 5 MIN PRN
Status: ACTIVE | OUTPATIENT
Start: 2024-03-01 | End: 2024-03-01

## 2024-03-01 RX ORDER — MAGNESIUM SULFATE HEPTAHYDRATE 40 MG/ML
2000 INJECTION, SOLUTION INTRAVENOUS PRN
Status: DISCONTINUED | OUTPATIENT
Start: 2024-03-01 | End: 2024-03-01 | Stop reason: HOSPADM

## 2024-03-01 RX ORDER — GLIPIZIDE 5 MG/1
5 TABLET ORAL
Status: DISCONTINUED | OUTPATIENT
Start: 2024-03-02 | End: 2024-03-01 | Stop reason: HOSPADM

## 2024-03-01 RX ORDER — SODIUM CHLORIDE 0.9 % (FLUSH) 0.9 %
5-40 SYRINGE (ML) INJECTION PRN
Status: DISCONTINUED | OUTPATIENT
Start: 2024-03-01 | End: 2024-03-01 | Stop reason: HOSPADM

## 2024-03-01 RX ORDER — POLYETHYLENE GLYCOL 3350 17 G/17G
17 POWDER, FOR SOLUTION ORAL DAILY PRN
Status: DISCONTINUED | OUTPATIENT
Start: 2024-03-01 | End: 2024-03-01 | Stop reason: HOSPADM

## 2024-03-01 RX ORDER — NITROGLYCERIN 0.4 MG/1
0.4 TABLET SUBLINGUAL EVERY 5 MIN PRN
Status: DISCONTINUED | OUTPATIENT
Start: 2024-03-01 | End: 2024-03-01 | Stop reason: HOSPADM

## 2024-03-01 RX ORDER — NITROGLYCERIN 0.4 MG/1
0.4 TABLET SUBLINGUAL EVERY 5 MIN PRN
Status: ACTIVE | OUTPATIENT
Start: 2024-03-01 | End: 2024-03-01

## 2024-03-01 RX ORDER — SERTRALINE HYDROCHLORIDE 100 MG/1
100 TABLET, FILM COATED ORAL DAILY
Status: DISCONTINUED | OUTPATIENT
Start: 2024-03-01 | End: 2024-03-01 | Stop reason: HOSPADM

## 2024-03-01 RX ORDER — ENOXAPARIN SODIUM 100 MG/ML
40 INJECTION SUBCUTANEOUS DAILY
Status: DISCONTINUED | OUTPATIENT
Start: 2024-03-01 | End: 2024-03-01

## 2024-03-01 RX ORDER — TETRAKIS(2-METHOXYISOBUTYLISOCYANIDE)COPPER(I) TETRAFLUOROBORATE 1 MG/ML
30 INJECTION, POWDER, LYOPHILIZED, FOR SOLUTION INTRAVENOUS
Status: COMPLETED | OUTPATIENT
Start: 2024-03-01 | End: 2024-03-01

## 2024-03-01 RX ORDER — PANTOPRAZOLE SODIUM 40 MG/1
40 TABLET, DELAYED RELEASE ORAL DAILY
Status: DISCONTINUED | OUTPATIENT
Start: 2024-03-01 | End: 2024-03-01 | Stop reason: HOSPADM

## 2024-03-01 RX ORDER — ATROPINE SULFATE 0.1 MG/ML
0.5 INJECTION INTRAVENOUS EVERY 5 MIN PRN
Status: ACTIVE | OUTPATIENT
Start: 2024-03-01 | End: 2024-03-01

## 2024-03-01 RX ORDER — POTASSIUM CHLORIDE 20 MEQ/1
40 TABLET, EXTENDED RELEASE ORAL PRN
Status: DISCONTINUED | OUTPATIENT
Start: 2024-03-01 | End: 2024-03-01 | Stop reason: HOSPADM

## 2024-03-01 RX ORDER — LISINOPRIL 10 MG/1
10 TABLET ORAL DAILY
Status: DISCONTINUED | OUTPATIENT
Start: 2024-03-01 | End: 2024-03-01 | Stop reason: HOSPADM

## 2024-03-01 RX ORDER — LISINOPRIL AND HYDROCHLOROTHIAZIDE 12.5; 1 MG/1; MG/1
1 TABLET ORAL DAILY
Status: DISCONTINUED | OUTPATIENT
Start: 2024-03-01 | End: 2024-03-01

## 2024-03-01 RX ORDER — ALOGLIPTIN 25 MG/1
25 TABLET, FILM COATED ORAL DAILY
Status: DISCONTINUED | OUTPATIENT
Start: 2024-03-01 | End: 2024-03-01 | Stop reason: HOSPADM

## 2024-03-01 RX ORDER — VITAMIN B COMPLEX
1000 TABLET ORAL DAILY
Status: DISCONTINUED | OUTPATIENT
Start: 2024-03-01 | End: 2024-03-01 | Stop reason: HOSPADM

## 2024-03-01 RX ORDER — ACETAMINOPHEN 325 MG/1
650 TABLET ORAL EVERY 6 HOURS PRN
Status: DISCONTINUED | OUTPATIENT
Start: 2024-03-01 | End: 2024-03-01 | Stop reason: HOSPADM

## 2024-03-01 RX ADMIN — Medication 38.2 MILLICURIE: at 13:06

## 2024-03-01 RX ADMIN — Medication 17.5 MILLICURIE: at 09:51

## 2024-03-01 RX ADMIN — REGADENOSON 0.4 MG: 0.08 INJECTION, SOLUTION INTRAVENOUS at 09:49

## 2024-03-01 RX ADMIN — ASPIRIN 325 MG: 325 TABLET ORAL at 08:10

## 2024-03-01 RX ADMIN — SODIUM CHLORIDE, PRESERVATIVE FREE 10 ML: 5 INJECTION INTRAVENOUS at 13:06

## 2024-03-01 ASSESSMENT — PAIN DESCRIPTION - DESCRIPTORS: DESCRIPTORS: DULL;SHARP

## 2024-03-01 ASSESSMENT — PAIN SCALES - GENERAL
PAINLEVEL_OUTOF10: 1
PAINLEVEL_OUTOF10: 1

## 2024-03-01 ASSESSMENT — PAIN DESCRIPTION - LOCATION: LOCATION: CHEST

## 2024-03-01 ASSESSMENT — LIFESTYLE VARIABLES
HOW MANY STANDARD DRINKS CONTAINING ALCOHOL DO YOU HAVE ON A TYPICAL DAY: PATIENT DOES NOT DRINK
HOW OFTEN DO YOU HAVE A DRINK CONTAINING ALCOHOL: NEVER

## 2024-03-01 ASSESSMENT — PAIN DESCRIPTION - FREQUENCY: FREQUENCY: INTERMITTENT

## 2024-03-01 ASSESSMENT — HEART SCORE: ECG: 1

## 2024-03-01 ASSESSMENT — PAIN - FUNCTIONAL ASSESSMENT: PAIN_FUNCTIONAL_ASSESSMENT: 0-10

## 2024-03-01 ASSESSMENT — PAIN DESCRIPTION - PAIN TYPE: TYPE: ACUTE PAIN

## 2024-03-01 NOTE — PROGRESS NOTES
Patient discharged to home per orders. IV discontinued intact.  Discharge instructions reviewed written and verbal.  Patient states all belongings present.  Refuses wheelchair, left unit ambulatory.

## 2024-03-01 NOTE — DISCHARGE SUMMARY
Family Medicine Discharge Summary    Carlos Winter  :  1965  MRN:  [id number]    Admit date:  3/1/2024  Discharge date:  3/1/2024    Admitting Physician:  Jeane Jaime MD    Discharge Diagnoses:    Patient Active Problem List   Diagnosis    HTN (hypertension)    GERD (gastroesophageal reflux disease)    Anxiety    Obesity, Class III, BMI 40-49.9 (morbid obesity) (HCC)    Tobacco use    Hyperlipidemia    Rectal bleeding    Slow transit constipation    Vitamin D deficiency    Obstructive sleep apnea    Acute cellulitis    Poison ivy    Type 2 diabetes mellitus without complication, without long-term current use of insulin (HCC)    Type 2 diabetes mellitus    Chest pain       Admission Condition:  fair  Discharged Condition:  fair    Hospital Course:   59 yo with diabetes and obesity presents to  with chest pain. He had been scheduled for cardiac stress next week, but that was performed today. Stress found \"mild severity global left ventricular stress perfusion defect that is large in size present in the inferior segment(s) that is partially reversible.\" Patient was seen by cardiology and determined to not need a cardiac catheterization. Patient is very anxious to go home, so his discharged without my seeing him.    Discharge Medications:         Medication List        CONTINUE taking these medications      atorvastatin 40 MG tablet  Commonly known as: LIPITOR  Take 1 tablet by mouth at bedtime     * blood glucose monitor kit and supplies  1 kit by Other route in the morning and at bedtime     * blood glucose monitor kit and supplies  Dispense sufficient amount for indicated testing frequency plus additional to accommodate PRN testing needs. Dispense all needed supplies to include: monitor, strips, lancing device, lancets, control solutions, alcohol swabs.     dapagliflozin 10 MG tablet  Commonly known as: Farxiga  Take 1 tablet by mouth every morning     * FreeStyle Gabriel 3 Sensor Misc  Wear each  sensor for 14 days     * FreeStyle Gabriel 3 Sensor Misc  1 each by Does not apply route every 14 days     glipiZIDE 2.5 MG extended release tablet  Commonly known as: Glucotrol XL  Take 2 tablets by mouth daily for 1 week, then increase to 3 tablets daily. Take with breakfast, do not take on an empty stomach.     Lancets Misc  1 each by Does not apply route 2 times daily     lisinopril-hydroCHLOROthiazide 10-12.5 MG per tablet  Commonly known as: PRINZIDE;ZESTORETIC  Take 1 tablet by mouth daily     nystatin 137583 UNIT/GM powder  Commonly known as: MYCOSTATIN  Apply In the morning     pantoprazole 40 MG tablet  Commonly known as: PROTONIX  Take 1 tablet by mouth daily     Respiratory Therapy Supplies Sonia  1 Device by Does not apply route nightly     sertraline 100 MG tablet  Commonly known as: ZOLOFT  Take 1 tablet by mouth daily     SITagliptin 100 MG tablet  Commonly known as: Januvia  TAKE 1 TABLET BY MOUTH EVERY DAY     vitamin D3 25 MCG (1000 UT) Tabs tablet  Generic drug: vitamin D  Take 1 tablet by mouth daily           * This list has 4 medication(s) that are the same as other medications prescribed for you. Read the directions carefully, and ask your doctor or other care provider to review them with you.                STOP taking these medications      blood glucose test strips              Consults:  cardiology    Significant Diagnostic Studies:  nuclear stress    Treatments:   supportive therapies    Disposition:   home  Follow up with Janis Brown MD in 1-2  weeks.    Signed:  Jeane Jaime MD, FAAFP  3/1/2024, 5:34 PM

## 2024-03-01 NOTE — CARE COORDINATION
Case Management Assessment  Initial Evaluation    Date/Time of Evaluation: 3/1/2024 4:47 PM  Assessment Completed by: Francheska Atkins RN    If patient is discharged prior to next notation, then this note serves as note for discharge by case management.    Patient Name: Carlos Winter                   YOB: 1965  Diagnosis: Chest pain [R07.9]  Chest pain, unspecified type [R07.9]                   Date / Time: 3/1/2024  7:49 AM    Patient Admission Status: Observation   Readmission Risk (Low < 19, Mod (19-27), High > 27): Readmission Risk Score: 5.8    Current PCP: Janis Brown MD  PCP verified by CM? Yes    Chart Reviewed: Yes      History Provided by: Patient  Patient Orientation: Alert and Oriented    Patient Cognition: Alert    Hospitalization in the last 30 days (Readmission):  No    If yes, Readmission Assessment in  Navigator will be completed.    Advance Directives:      Code Status: Full Code   Patient's Primary Decision Maker is: Legal Next of Kin      Discharge Planning:    Patient lives with: Alone Type of Home: House  Primary Care Giver: Self  Patient Support Systems include: Spouse/Significant Other, Family Members   Current Financial resources: None  Current community resources: None  Current services prior to admission: C-pap, Durable Medical Equipment            Current DME: Cpap, Glucometer            Type of Home Care services:  None    ADLS  Prior functional level: Independent in ADLs/IADLs  Current functional level: Independent in ADLs/IADLs    PT AM-PAC:   /24  OT AM-PAC:   /24    Family can provide assistance at DC: Yes  Would you like Case Management to discuss the discharge plan with any other family members/significant others, and if so, who? Yes (Significant other, Chioma)  Plans to Return to Present Housing: Yes  Other Identified Issues/Barriers to RETURNING to current housing: NA  Potential Assistance needed at discharge: N/A            Potential DME:    Patient

## 2024-03-01 NOTE — H&P
Patient not examined to be able to do H&P - was seen by both EC and cardiology and wants to be discharged as cardiology is recommending.     Jeane Jaime MD, FAAFP  3/1/2024 [id number]

## 2024-03-01 NOTE — ED PROVIDER NOTES
EMERGENCY DEPARTMENT ENCOUNTER    Pt Name: Carlos Winter  MRN: [id number]  Birthdate 1965  Date of evaluation: 3/1/24  CHIEF COMPLAINT       Chief Complaint   Patient presents with    Chest Pain     HISTORY OF PRESENT ILLNESS   HPI  Chest pain for months.  Felt it this morning.  Pressure central retrosternal.  Nonradiating.  No dyspnea.  Sometimes feels hot but no diaphoresis.  Uses tobacco.  Has hypertension hyperlipidemia and diabetes.  Dad had coronary disease in his 70s.  Not premature though.  No premature family history of coronary disease.  No recent travel.  No leg pain or leg edema.  No back pain.  No abdominal pain.  No nausea or vomiting.  Saw his PCP already about this, planning to get a stress test next week.      REVIEW OF SYSTEMS     Review of Systems   All other systems reviewed and are negative.    PASTMEDICAL HISTORY     Past Medical History:   Diagnosis Date    Anxiety 6/3/2015    GERD (gastroesophageal reflux disease) 6/3/2015    Hyperlipidemia     Hypertension     Obesity, Class III, BMI 40-49.9 (morbid obesity) ([facility]) 6/3/2015    Obstructive sleep apnea     uses cpap at home     Past Problem List  Patient Active Problem List   Diagnosis Code    HTN (hypertension) I10    GERD (gastroesophageal reflux disease) K21.9    Anxiety F41.9    Obesity, Class III, BMI 40-49.9 (morbid obesity) ([facility]) E66.01    Tobacco use Z72.0    Hyperlipidemia E78.5    Rectal bleeding K62.5    Slow transit constipation K59.01    Vitamin D deficiency E55.9    Obstructive sleep apnea G47.33    Acute cellulitis L03.90    Poison ivy L23.7    Type 2 diabetes mellitus without complication, without long-term current use of insulin ([facility]) E11.9    Type 2 diabetes mellitus E11.9     SURGICAL HISTORY       Past Surgical History:   Procedure Laterality Date    APPENDECTOMY      COLONOSCOPY  08/25/2017    multiple polypectomy    [place] COLSC FLX W/RMVL OF TUMOR POLYP LESION SNARE TQ N/A 8/25/2017    COLONOSCOPY POLYPECTOMY SNARE/COLD

## 2024-03-01 NOTE — PLAN OF CARE
Problem: Discharge Planning  Goal: Discharge to home or other facility with appropriate resources  3/1/2024 1747 by Hayley Ovalle, RN  Outcome: Completed  3/1/2024 1647 by Hayley Ovalle, RN  Outcome: Progressing  Flowsheets (Taken 3/1/2024 1139)  Discharge to home or other facility with appropriate resources:   Identify barriers to discharge with patient and caregiver   Identify discharge learning needs (meds, wound care, etc)   Refer to discharge planning if patient needs post-hospital services based on physician order or complex needs related to functional status, cognitive ability or social support system   Arrange for needed discharge resources and transportation as appropriate   Arrange for interpreters to assist at discharge as needed     Problem: Pain  Goal: Verbalizes/displays adequate comfort level or baseline comfort level  Outcome: Completed     Problem: Chronic Conditions and Co-morbidities  Goal: Patient's chronic conditions and co-morbidity symptoms are monitored and maintained or improved  Outcome: Completed

## 2024-03-01 NOTE — PLAN OF CARE
Problem: Discharge Planning  Goal: Discharge to home or other facility with appropriate resources  Outcome: Progressing  Flowsheets (Taken 3/1/2024 1139)  Discharge to home or other facility with appropriate resources:   Identify barriers to discharge with patient and caregiver   Identify discharge learning needs (meds, wound care, etc)   Refer to discharge planning if patient needs post-hospital services based on physician order or complex needs related to functional status, cognitive ability or social support system   Arrange for needed discharge resources and transportation as appropriate   Arrange for interpreters to assist at discharge as needed

## 2024-03-01 NOTE — PROGRESS NOTES
Dr. Jaime notified regarding stress results, order received for cardiology consult, Dr. Carter notified.

## 2024-03-01 NOTE — ED NOTES
Report given to KATHY Mueller from [facility].   Report method by phone   The following was reviewed with receiving RN:   Current vital signs:  /79   Pulse 64   Temp 98.1 °F (36.7 °C) (Oral)   Resp 14   Ht 1.829 m (6')   Wt 136.1 kg (300 lb)   SpO2 96%   BMI 40.69 kg/m²                MEWS Score: 0     Any medication or safety alerts were reviewed. Any pending diagnostics and notifications were also reviewed, as well as any safety concerns or issues, abnormal labs, abnormal imaging, and abnormal assessment findings. Questions were answered.

## 2024-03-01 NOTE — CONSULTS
use of insulin (HCC)     Type 2 diabetes mellitus     Chest pain      Plan of Treatment:   Medications reviewed  1: Atypical chest pain, negative ECG, negative cardiac markers, borderline positive stress test probable artifact at present no clear indication for cardiac cath  2: Diabetes mellitus continue current dose of Jardiance, alogliptin, glipizide  3: Hyperlipidemia continue current dose of Lipitor 40 mg a day  4: Hypertension blood pressure under control on lisinopril 10 mg and also on hydrochlorothiazide 12.5 mg  5: Morbid obesity discussed with the patient to change dietary habits increase physical activities to reduce    Patient has very atypical chest pain no chest pain during exertion  He does have cardiac risk factors and if he continue to be symptomatic we should perform treadmill exercise stress test patient is very active .  If treadmill exercise stress test is negative and patient get heart rate blood pressure product over 25,000 and Mejia score is more than 5 then there is no clear indication for cardiac cath  Okay to discharge home  Follow-up with the PCP      Electronically signed by Danisha Carter MD on 3/1/2024 at 4:33 PM

## 2024-03-02 LAB
EKG ATRIAL RATE: 70 BPM
EKG P AXIS: 40 DEGREES
EKG P-R INTERVAL: 192 MS
EKG Q-T INTERVAL: 404 MS
EKG QRS DURATION: 88 MS
EKG QTC CALCULATION (BAZETT): 436 MS
EKG T AXIS: 36 DEGREES
EKG VENTRICULAR RATE: 70 BPM

## 2024-03-02 PROCEDURE — 93010 ELECTROCARDIOGRAM REPORT: CPT | Performed by: INTERNAL MEDICINE

## 2024-03-04 ENCOUNTER — CARE COORDINATION (OUTPATIENT)
Dept: CASE MANAGEMENT | Age: 59
End: 2024-03-04

## 2024-03-04 NOTE — CARE COORDINATION
Care Transitions Outreach Attempt #1  Initial 24 hour call.     Call within 2 business days of discharge: Yes   Attempted to reach patient for transitions of care follow up. Unable to reach patient. HIPAA compliant message left on  requesting a return call.    Patient: Carlos Winter Patient : 1965 MRN: [id number]    Last Discharge Facility       Date Complaint Diagnosis Description Type Department Provider    3/1/24 Chest Pain Chest pain, unspecified type ED to Hosp-Admission (Discharged) (ADMITTED) [facility] Janis Brown MD; Presley Parker...              Was this an external facility discharge? No Discharge Facility: [facility]    Noted following upcoming appointments from discharge chart review:   [facility] follow up appointment(s):   Future Appointments   Date Time Provider Department Center   3/6/2024  7:00 AM [facility] MED WHOLE BODY INJ RM [facility]   3/6/2024  7:30 AM [facility]UC MED PORTABLE TC RM [facility]   3/6/2024  9:30 AM [facility] STRESS LAB 3 [facility]   3/6/2024 11:30 AM [facility] NUC MED PORTABLE TC RM [facility] N[facility]iolog   2024  3:30 PM [facility]        Veronique Bueno LPN  Care Transition Nurse

## 2024-03-05 ENCOUNTER — CARE COORDINATION (OUTPATIENT)
Dept: CASE MANAGEMENT | Age: 59
End: 2024-03-05

## 2024-03-05 NOTE — CARE COORDINATION
Care Transitions Outreach Attempt #2  Initial 24 hour call.     Call within 2 business days of discharge: Yes   Attempted to reach patient for transitions of care follow up. Unable to reach patient. HIPAA compliant message left on  requesting a return call. Will end care transitions if no return call received. Chart routed to PCP for scheduling his hospital follow up.     Patient: Carlos Winter Patient : 1965 MRN: [id number]    Last Discharge Facility       Date Complaint Diagnosis Description Type Department Provider    3/1/24 Chest Pain Chest pain, unspecified type ED to Hosp-Admission (Discharged) (ADMITTED) Janis Carmona MD; Presley Parker...              Was this an external facility discharge? No Discharge Facility: [facility]    Noted following upcoming appointments from discharge chart review:   [facility] follow up appointment(s):   Future Appointments   Date Time Provider Department Center   2024  3:30 PM [facility]        Veronique Bueno LPN  Care Transition Nurse

## 2024-04-01 ENCOUNTER — HOSPITAL ENCOUNTER (OUTPATIENT)
Age: 59
Setting detail: SPECIMEN
Discharge: HOME | End: 2024-04-01

## 2024-04-01 DIAGNOSIS — R53.83 OTHER FATIGUE: ICD-10-CM

## 2024-04-01 DIAGNOSIS — R07.9 CHEST PAIN, UNSPECIFIED TYPE: ICD-10-CM

## 2024-04-01 PROBLEM — E11.65 TYPE 2 DIABETES MELLITUS WITH HYPERGLYCEMIA (HCC): Status: ACTIVE | Noted: 2024-04-01

## 2024-04-01 LAB
EST. AVERAGE GLUCOSE BLD GHB EST-MCNC: 171 MG/DL
HBA1C MFR BLD: 7.6 % (ref 4–6)
TSH SERPL DL<=0.05 MIU/L-ACNC: 2.13 UIU/ML (ref 0.27–4.2)

## 2024-04-04 ENCOUNTER — APPOINTMENT (OUTPATIENT)
Dept: GENERAL RADIOLOGY | Age: 59
End: 2024-04-04
Payer: COMMERCIAL

## 2024-04-04 ENCOUNTER — HOSPITAL ENCOUNTER (OUTPATIENT)
Age: 59
Setting detail: OBSERVATION
Discharge: HOME | End: 2024-04-05
Attending: EMERGENCY MEDICINE | Admitting: EMERGENCY MEDICINE
Payer: COMMERCIAL

## 2024-04-04 DIAGNOSIS — R07.9 CHEST PAIN, UNSPECIFIED TYPE: Primary | ICD-10-CM

## 2024-04-04 DIAGNOSIS — I20.9 ANGINA PECTORIS (HCC): ICD-10-CM

## 2024-04-04 PROBLEM — I24.9 ACS (ACUTE CORONARY SYNDROME) (HCC): Status: ACTIVE | Noted: 2024-04-04

## 2024-04-04 LAB
ANION GAP SERPL CALCULATED.3IONS-SCNC: 14 MMOL/L (ref 9–16)
BASOPHILS # BLD: 0.04 K/UL (ref 0–0.2)
BASOPHILS NFR BLD: 1 % (ref 0–2)
BUN SERPL-MCNC: 17 MG/DL (ref 6–20)
CALCIUM SERPL-MCNC: 9.7 MG/DL (ref 8.6–10.4)
CHLORIDE SERPL-SCNC: 102 MMOL/L (ref 98–107)
CO2 SERPL-SCNC: 22 MMOL/L (ref 20–31)
CREAT SERPL-MCNC: 0.9 MG/DL (ref 0.7–1.2)
EOSINOPHIL # BLD: 0.11 K/UL (ref 0–0.44)
EOSINOPHILS RELATIVE PERCENT: 1 % (ref 1–4)
ERYTHROCYTE [DISTWIDTH] IN BLOOD BY AUTOMATED COUNT: 13.5 % (ref 11.8–14.4)
GFR SERPL CREATININE-BSD FRML MDRD: >90 ML/MIN/1.73M2
GLUCOSE BLD-MCNC: 140 MG/DL (ref 75–110)
GLUCOSE BLD-MCNC: 159 MG/DL (ref 75–110)
GLUCOSE SERPL-MCNC: 167 MG/DL (ref 74–99)
HCT VFR BLD AUTO: 51.8 % (ref 40.7–50.3)
HGB BLD-MCNC: 17.5 G/DL (ref 13–17)
IMM GRANULOCYTES # BLD AUTO: 0.04 K/UL (ref 0–0.3)
IMM GRANULOCYTES NFR BLD: 1 %
LYMPHOCYTES NFR BLD: 1.89 K/UL (ref 1.1–3.7)
LYMPHOCYTES RELATIVE PERCENT: 22 % (ref 24–43)
MCH RBC QN AUTO: 30.2 PG (ref 25.2–33.5)
MCHC RBC AUTO-ENTMCNC: 33.8 G/DL (ref 28.4–34.8)
MCV RBC AUTO: 89.5 FL (ref 82.6–102.9)
MONOCYTES NFR BLD: 0.72 K/UL (ref 0.1–1.2)
MONOCYTES NFR BLD: 8 % (ref 3–12)
NEUTROPHILS NFR BLD: 67 % (ref 36–65)
NEUTS SEG NFR BLD: 5.83 K/UL (ref 1.5–8.1)
NRBC BLD-RTO: 0 PER 100 WBC
PLATELET # BLD AUTO: 343 K/UL (ref 138–453)
PMV BLD AUTO: 11.1 FL (ref 8.1–13.5)
POTASSIUM SERPL-SCNC: 4.3 MMOL/L (ref 3.7–5.3)
RBC # BLD AUTO: 5.79 M/UL (ref 4.21–5.77)
SODIUM SERPL-SCNC: 138 MMOL/L (ref 136–145)
TROPONIN I SERPL HS-MCNC: 12 NG/L (ref 0–22)
TROPONIN I SERPL HS-MCNC: 13 NG/L (ref 0–22)
WBC OTHER # BLD: 8.6 K/UL (ref 3.5–11.3)

## 2024-04-04 PROCEDURE — 71046 X-RAY EXAM CHEST 2 VIEWS: CPT

## 2024-04-04 PROCEDURE — 6370000000 HC RX 637 (ALT 250 FOR IP)

## 2024-04-04 PROCEDURE — 84484 ASSAY OF TROPONIN QUANT: CPT

## 2024-04-04 PROCEDURE — G0378 HOSPITAL OBSERVATION PER HR: HCPCS

## 2024-04-04 PROCEDURE — 82947 ASSAY GLUCOSE BLOOD QUANT: CPT

## 2024-04-04 PROCEDURE — 2580000003 HC RX 258

## 2024-04-04 PROCEDURE — 80048 BASIC METABOLIC PNL TOTAL CA: CPT

## 2024-04-04 PROCEDURE — 99285 EMERGENCY DEPT VISIT HI MDM: CPT

## 2024-04-04 PROCEDURE — 85025 COMPLETE CBC W/AUTO DIFF WBC: CPT

## 2024-04-04 PROCEDURE — 93005 ELECTROCARDIOGRAM TRACING: CPT

## 2024-04-04 RX ORDER — LISINOPRIL 10 MG/1
10 TABLET ORAL DAILY
Status: DISCONTINUED | OUTPATIENT
Start: 2024-04-05 | End: 2024-04-05 | Stop reason: HOSPADM

## 2024-04-04 RX ORDER — LISINOPRIL AND HYDROCHLOROTHIAZIDE 12.5; 1 MG/1; MG/1
1 TABLET ORAL DAILY
Status: DISCONTINUED | OUTPATIENT
Start: 2024-04-05 | End: 2024-04-04

## 2024-04-04 RX ORDER — ENOXAPARIN SODIUM 100 MG/ML
30 INJECTION SUBCUTANEOUS 2 TIMES DAILY
Status: DISCONTINUED | OUTPATIENT
Start: 2024-04-04 | End: 2024-04-04

## 2024-04-04 RX ORDER — ONDANSETRON 4 MG/1
4 TABLET, ORALLY DISINTEGRATING ORAL EVERY 8 HOURS PRN
Status: DISCONTINUED | OUTPATIENT
Start: 2024-04-04 | End: 2024-04-05 | Stop reason: HOSPADM

## 2024-04-04 RX ORDER — SODIUM CHLORIDE 9 MG/ML
INJECTION, SOLUTION INTRAVENOUS PRN
Status: DISCONTINUED | OUTPATIENT
Start: 2024-04-04 | End: 2024-04-05 | Stop reason: HOSPADM

## 2024-04-04 RX ORDER — ACETAMINOPHEN 325 MG/1
650 TABLET ORAL EVERY 4 HOURS PRN
Status: DISCONTINUED | OUTPATIENT
Start: 2024-04-04 | End: 2024-04-05 | Stop reason: HOSPADM

## 2024-04-04 RX ORDER — SODIUM CHLORIDE, SODIUM LACTATE, POTASSIUM CHLORIDE, CALCIUM CHLORIDE 600; 310; 30; 20 MG/100ML; MG/100ML; MG/100ML; MG/100ML
INJECTION, SOLUTION INTRAVENOUS CONTINUOUS
Status: DISCONTINUED | OUTPATIENT
Start: 2024-04-04 | End: 2024-04-04

## 2024-04-04 RX ORDER — INSULIN LISPRO 100 [IU]/ML
0-16 INJECTION, SOLUTION INTRAVENOUS; SUBCUTANEOUS
Status: DISCONTINUED | OUTPATIENT
Start: 2024-04-04 | End: 2024-04-05 | Stop reason: HOSPADM

## 2024-04-04 RX ORDER — HYDROCHLOROTHIAZIDE 12.5 MG/1
12.5 CAPSULE, GELATIN COATED ORAL DAILY
Status: DISCONTINUED | OUTPATIENT
Start: 2024-04-05 | End: 2024-04-05 | Stop reason: HOSPADM

## 2024-04-04 RX ORDER — INSULIN LISPRO 100 [IU]/ML
0-4 INJECTION, SOLUTION INTRAVENOUS; SUBCUTANEOUS
Status: DISCONTINUED | OUTPATIENT
Start: 2024-04-04 | End: 2024-04-04

## 2024-04-04 RX ORDER — SODIUM CHLORIDE, SODIUM LACTATE, POTASSIUM CHLORIDE, CALCIUM CHLORIDE 600; 310; 30; 20 MG/100ML; MG/100ML; MG/100ML; MG/100ML
INJECTION, SOLUTION INTRAVENOUS CONTINUOUS
Status: DISCONTINUED | OUTPATIENT
Start: 2024-04-04 | End: 2024-04-05 | Stop reason: HOSPADM

## 2024-04-04 RX ORDER — INSULIN LISPRO 100 [IU]/ML
0-4 INJECTION, SOLUTION INTRAVENOUS; SUBCUTANEOUS NIGHTLY
Status: DISCONTINUED | OUTPATIENT
Start: 2024-04-04 | End: 2024-04-04

## 2024-04-04 RX ORDER — PANTOPRAZOLE SODIUM 40 MG/1
40 TABLET, DELAYED RELEASE ORAL DAILY
Status: DISCONTINUED | OUTPATIENT
Start: 2024-04-05 | End: 2024-04-05 | Stop reason: HOSPADM

## 2024-04-04 RX ORDER — VITAMIN B COMPLEX
1000 TABLET ORAL DAILY
Status: DISCONTINUED | OUTPATIENT
Start: 2024-04-05 | End: 2024-04-05 | Stop reason: HOSPADM

## 2024-04-04 RX ORDER — SODIUM CHLORIDE 0.9 % (FLUSH) 0.9 %
5-40 SYRINGE (ML) INJECTION EVERY 12 HOURS SCHEDULED
Status: DISCONTINUED | OUTPATIENT
Start: 2024-04-04 | End: 2024-04-05 | Stop reason: HOSPADM

## 2024-04-04 RX ORDER — INSULIN LISPRO 100 [IU]/ML
0-4 INJECTION, SOLUTION INTRAVENOUS; SUBCUTANEOUS NIGHTLY
Status: DISCONTINUED | OUTPATIENT
Start: 2024-04-04 | End: 2024-04-05 | Stop reason: HOSPADM

## 2024-04-04 RX ORDER — ATORVASTATIN CALCIUM 40 MG/1
40 TABLET, FILM COATED ORAL NIGHTLY
Status: DISCONTINUED | OUTPATIENT
Start: 2024-04-04 | End: 2024-04-05 | Stop reason: HOSPADM

## 2024-04-04 RX ORDER — SODIUM CHLORIDE 0.9 % (FLUSH) 0.9 %
5-40 SYRINGE (ML) INJECTION PRN
Status: DISCONTINUED | OUTPATIENT
Start: 2024-04-04 | End: 2024-04-05 | Stop reason: HOSPADM

## 2024-04-04 RX ORDER — ONDANSETRON 2 MG/ML
4 INJECTION INTRAMUSCULAR; INTRAVENOUS EVERY 6 HOURS PRN
Status: DISCONTINUED | OUTPATIENT
Start: 2024-04-04 | End: 2024-04-05 | Stop reason: HOSPADM

## 2024-04-04 RX ORDER — ENOXAPARIN SODIUM 100 MG/ML
40 INJECTION SUBCUTANEOUS DAILY
Status: DISCONTINUED | OUTPATIENT
Start: 2024-04-05 | End: 2024-04-05 | Stop reason: HOSPADM

## 2024-04-04 RX ADMIN — SODIUM CHLORIDE, PRESERVATIVE FREE 10 ML: 5 INJECTION INTRAVENOUS at 20:12

## 2024-04-04 ASSESSMENT — ENCOUNTER SYMPTOMS
COUGH: 0
NAUSEA: 1
VOMITING: 0
SORE THROAT: 0
BACK PAIN: 0
DIARRHEA: 0
SHORTNESS OF BREATH: 0
ABDOMINAL PAIN: 0
CONSTIPATION: 0

## 2024-04-04 ASSESSMENT — PAIN SCALES - GENERAL: PAINLEVEL_OUTOF10: 3

## 2024-04-04 ASSESSMENT — PAIN - FUNCTIONAL ASSESSMENT: PAIN_FUNCTIONAL_ASSESSMENT: 0-10

## 2024-04-04 ASSESSMENT — PAIN DESCRIPTION - LOCATION: LOCATION: CHEST

## 2024-04-04 NOTE — ED PROVIDER NOTES
[facility] ED     Emergency Department     Faculty Attestation    I performed a history and physical examination of the patient and discussed management with the resident. I reviewed the resident’s note and agree with the documented findings and plan of care. Any areas of disagreement are noted on the chart. I was personally present for the key portions of any procedures. I have documented in the chart those procedures where I was not present during the key portions. I have reviewed the emergency nurses triage note. I agree with the chief complaint, past medical history, past surgical history, allergies, medications, social and family history as documented unless otherwise noted below. For Physician Assistant/ Nurse Practitioner cases/documentation I have personally evaluated this patient and have completed at least one if not all key elements of the E/M (history, physical exam, and MDM). Additional findings are as noted.    11:04 AM EDT    Patient presents with chest pain that he has been having off and on for the past few months but worsened today.  Today's episode was also associated with nausea.  He denies any recent fever, cough, congestion.  Patient did have a recent stress test which showed intermediate risk but has not had any follow-up for that.  On exam, patient is resting comfortably in the bed.  Lungs clear to auscultation bilaterally and heart sounds are normal.  Abdomen is soft and nontender.  Will get EKG, chest x-ray, labs.  Will plan to admit patient for further cardiology evaluation.    EKG Interpretation    Interpreted by emergency department physician    Rhythm: normal sinus   Rate: normal  Axis: normal  Ectopy: none  Conduction: normal  ST Segments: normal  T Waves: normal  Q Waves: nonspecific    Clinical Impression: non-specific EKG    MD Maria Isabel Chang MD  Attending Emergency  Physician

## 2024-04-04 NOTE — ED NOTES
ED to inpatient nurses report      Chief Complaint:  Chief Complaint   Patient presents with    Chest Pain     Present to ED from: home    MOA:     LOC: alert and orientated to name, place, date  Mobility: Independent  Oxygen Baseline: RA    Current needs required: n/a   Pending ED orders: n/a  Present condition: resting on cot, RR even and NL, no distress noted     Why did the patient come to the ED?   Pt to ED c/o intermittent chest pain. Pt states he had an episode at work a couple hours ago where he had pain on the left side of his chest along with dizziness and nausea. Pt states he has been having episode of chest pain since December. Pt denies any cardiac hx or surgery, just HTN. Pt states he has had a stress test in the past and was supposed to have a cath, but has not been scheduled yet.   What is the plan? Cardiology consult   Any procedures or intervention occur? EKG, chest xray, labs   Any safety concerns?? N/a    Mental Status:  Level of Consciousness: Alert (0)    Psych Assessment:   Psychosocial  Psychosocial (WDL): Within Defined Limits  Vital signs   Vitals:    04/04/24 1020 04/04/24 1030 04/04/24 1130 04/04/24 1200   BP: (!) 131/90  (!) 136/99 127/79   Pulse: 74 69 68 68   Resp: 20 15 15 12   Temp: 96.8 °F (36 °C)      TempSrc: Oral      SpO2: 98% 98% 94% 92%   Weight: 134.6 kg (296 lb 11.8 oz)      Height: 1.829 m (6')           Vitals:  Patient Vitals for the past 24 hrs:   BP Temp Temp src Pulse Resp SpO2 Height Weight   04/04/24 1200 127/79 -- -- 68 12 92 % -- --   04/04/24 1130 (!) 136/99 -- -- 68 15 94 % -- --   04/04/24 1030 -- -- -- 69 15 98 % -- --   04/04/24 1020 (!) 131/90 96.8 °F (36 °C) Oral 74 20 98 % 1.829 m (6') 134.6 kg (296 lb 11.8 oz)      Visit Vitals  /79   Pulse 68   Temp 96.8 °F (36 °C) (Oral)   Resp 12   Ht 1.829 m (6')   Wt 134.6 kg (296 lb 11.8 oz)   SpO2 92%   BMI 40.25 kg/m²        LDAs:      Ambulatory Status:  Presents to emergency department  because of falls

## 2024-04-04 NOTE — CARE COORDINATION
DISCHARGE PLANNING EVALUATION: OP/OBSERVATION        4/4/24, 5:48 PM EDT    Carlos Winter         Location: OBS 23/23-1   Reason for hospitalization: Chest pain [R07.9]  Chest pain, unspecified type [R07.9]     CM Services requested for transitional needs.     PCP: Janis Brown MD    Transportation/Food Security/Housekeeping Addressed:  No issues identified.     Equipment needs:     Transition plan: home with s.o.

## 2024-04-04 NOTE — H&P
lab results.  Labs Reviewed   CBC WITH AUTO DIFFERENTIAL - Abnormal; Notable for the following components:       Result Value    RBC 5.79 (*)     Hemoglobin 17.5 (*)     Hematocrit 51.8 (*)     Neutrophils % 67 (*)     Lymphocytes % 22 (*)     Immature Granulocytes 1 (*)     All other components within normal limits   BASIC METABOLIC PANEL - Abnormal; Notable for the following components:    Glucose 167 (*)     All other components within normal limits   POC GLUCOSE FINGERSTICK - Abnormal; Notable for the following components:    POC Glucose 159 (*)     All other components within normal limits   TROPONIN   TROPONIN   TROPONIN   CBC WITH AUTO DIFFERENTIAL   BASIC METABOLIC PANEL W/ REFLEX TO MG FOR LOW K   POCT GLUCOSE   POCT GLUCOSE   POCT GLUCOSE       SCREENING TOOLS:    HEART Risk Score for Chest Pain Patients  History and Physical Exam Suspicion Level  (Nausea, Vomiting, Diaphoresis, Radiation, Exertion)  Slightly Suspicious (0 pts)  Moderately Suspicious (1 pt)  Highly Suspicious (2 pts)  EKG Interpretation  Normal (0 pts)  Non-Specific Repolarization Disturbance (1 pt)  Significant ST-Depression (2 pts)  Age of Patient (in years)  = 45 (0 pts)  46-64 (1 pt)  = 65 (2 pts)  Risk Factors  No Risk Factors (0 pts)  1-2 Risk Factors (1 pt)  = 3 Risk Factors (2 pts)  Risk Factors Include:  Hypercholesterolemia  Hypertension  Diabetes Mellitus  Cigarette smoking  Positive family history  Obesity  CAD  (SLE, CKDz, HIV, Cocaine abuse)  Troponin Levels  = Normal Limit (0 pts)  1-3 Times Normal Limit (1 pt)  > 3 Times Normal Limit (2 pts)  TOTAL:    Percent Risk for Major Adverse Cardiac Event (MACE)  0-3 pts indicates low risk for MACE   2.5% (DISCHARGE)   4-7 pts indicates moderate risk for MACE  20.3% (OBS)  8-10 pts indicates high risk for MACE  72.7% (EARLY INVASIVE TX)    CDU IMPRESSION / PLAN      Carlos Winter is a 58 y.o. male who presents with complaints of persistent intermittent episodes of chest  - - -

## 2024-04-04 NOTE — ED PROVIDER NOTES
[facility] ED  Emergency Department Encounter  Emergency Medicine Resident     Pt Name:Carlos Winter  MRN: [id number]  Birthdate 1965  Date of evaluation: 4/4/24  PCP:  Janis Brown MD  Note Started: 10:23 AM EDT      CHIEF COMPLAINT       Chief Complaint   Patient presents with    Chest Pain       HISTORY OF PRESENT ILLNESS  (Location/Symptom, Timing/Onset, Context/Setting, Quality, Duration, Modifying Factors, Severity.)      Carlos Winter is a 58 y.o. male with history of hyperlipidemia, hypertension, diabetes who presents with left-sided chest pain.  Patient states he has been having intermittent left-sided chest pain for the last 5 months.  Chest pain started again this morning around 7 AM.  It is sharp in nature and usually lasts a few seconds.  He currently does not have any chest pain.  He does have some associated nausea with the chest pain.  Denies any vomiting.  No shortness of breath.  No diaphoresis.  Chest pain is not worse with exertion.  Patient states he did recently have a stress test last month that his cardiologist said was indeterminate and they did not plan on doing a cardiac cath at that time.  He has no stents in his heart.  Denies any history of DVT or PE.  Not on blood thinners.    PAST MEDICAL / SURGICAL / SOCIAL / FAMILY HISTORY      has a past medical history of Anxiety, GERD (gastroesophageal reflux disease), Hyperlipidemia, Hypertension, Obesity, Class III, BMI 40-49.9 (morbid obesity) (HCC), and Obstructive sleep apnea.     has a past surgical history that includes Appendectomy; Colonoscopy (08/25/2017); and pr colsc flx w/rmvl of tumor polyp lesion snare tq (N/A, 8/25/2017).      Social History     Socioeconomic History    Marital status:      Spouse name: Not on file    Number of children: Not on file    Years of education: Not on file    Highest education level: Not on file   Occupational History    Not on file   Tobacco Use    Smoking  and Rhythm: Normal rate.      Pulses: Normal pulses.      Heart sounds: Normal heart sounds.   Pulmonary:      Effort: Pulmonary effort is normal. No respiratory distress.      Breath sounds: No stridor. No wheezing, rhonchi or rales.   Abdominal:      General: Abdomen is flat. There is no distension.      Palpations: Abdomen is soft.      Tenderness: There is no abdominal tenderness. There is no guarding or rebound.   Musculoskeletal:         General: Normal range of motion.      Cervical back: Normal range of motion.   Skin:     Findings: No bruising, erythema, lesion or rash.   Neurological:      General: No focal deficit present.      Mental Status: He is alert and oriented to person, place, and time.       DDX/DIAGNOSTIC RESULTS / EMERGENCY DEPARTMENT COURSE / MDM     Medical Decision Making  58-year-old male with history of hyperlipidemia, hypertension, diabetes who presents with left-sided chest pain.  Patient states he has been having intermittent left-sided chest pain for the last 5 months.  Chest pain started again this morning around 7 AM.  It is sharp in nature and usually lasts a few seconds.  He currently does not have any chest pain.  He does have some associated nausea with the chest pain.  Denies any vomiting.  No shortness of breath.  No diaphoresis.  Chest pain is not worse with exertion.  Patient states he did recently have a stress test last month that his cardiologist said was indeterminate and they did not plan on doing a cardiac cath at that time.  He has no stents in his heart.  Denies any history of DVT or PE.  Not on blood thinners.  Patient is nontoxic-appearing.  Vital signs stable.  Not hypoxic or tachycardic.  Heart sounds normal.  Breath sounds clear to auscultation bilaterally.  No respiratory distress.  No lower extremity swelling or calf tenderness bilaterally.  On chart review, patient had a stress test on 3/1 that showed intermediate risk.  At that time, Dr. Carter recommended no

## 2024-04-04 NOTE — ED NOTES
Pt to ED c/o intermittent chest pain. Pt states he had an episode at work a couple hours ago where he had pain on the left side of his chest along with dizziness and nausea. Pt states he has been having episode of chest pain since December. Pt denies any cardiac hx or surgery, just HTN. Pt states he has had a stress test in the past and was supposed to have a cath, but has not been scheduled yet. Pt placed on continuous cardiac monitor, bp and pulse ox. EKG completed, IV established, blood work drawn. Pt alert and oriented x4, talking in complete sentences, respirations even and unlabored. Pt acting age appropriate. White board updated, will continue to plan of care

## 2024-04-05 VITALS
WEIGHT: 296.74 LBS | RESPIRATION RATE: 18 BRPM | HEIGHT: 72 IN | TEMPERATURE: 98.2 F | BODY MASS INDEX: 40.19 KG/M2 | DIASTOLIC BLOOD PRESSURE: 85 MMHG | SYSTOLIC BLOOD PRESSURE: 115 MMHG | OXYGEN SATURATION: 97 % | HEART RATE: 75 BPM

## 2024-04-05 PROBLEM — R94.39 ABNORMAL STRESS TEST: Status: ACTIVE | Noted: 2024-04-05

## 2024-04-05 PROBLEM — I20.9 ANGINA PECTORIS (HCC): Status: ACTIVE | Noted: 2024-03-01

## 2024-04-05 LAB
ANION GAP SERPL CALCULATED.3IONS-SCNC: 10 MMOL/L (ref 9–16)
BASOPHILS # BLD: 0.05 K/UL (ref 0–0.2)
BASOPHILS NFR BLD: 1 % (ref 0–2)
BUN SERPL-MCNC: 16 MG/DL (ref 6–20)
CALCIUM SERPL-MCNC: 9 MG/DL (ref 8.6–10.4)
CHLORIDE SERPL-SCNC: 104 MMOL/L (ref 98–107)
CO2 SERPL-SCNC: 24 MMOL/L (ref 20–31)
CREAT SERPL-MCNC: 0.9 MG/DL (ref 0.7–1.2)
ECHO BSA: 2.61 M2
EKG ATRIAL RATE: 69 BPM
EKG ATRIAL RATE: 82 BPM
EKG P AXIS: 26 DEGREES
EKG P AXIS: 57 DEGREES
EKG P-R INTERVAL: 174 MS
EKG P-R INTERVAL: 180 MS
EKG Q-T INTERVAL: 398 MS
EKG Q-T INTERVAL: 404 MS
EKG QRS DURATION: 88 MS
EKG QRS DURATION: 92 MS
EKG QTC CALCULATION (BAZETT): 432 MS
EKG QTC CALCULATION (BAZETT): 464 MS
EKG R AXIS: 13 DEGREES
EKG R AXIS: 2 DEGREES
EKG T AXIS: 51 DEGREES
EKG T AXIS: 58 DEGREES
EKG VENTRICULAR RATE: 69 BPM
EKG VENTRICULAR RATE: 82 BPM
EOSINOPHIL # BLD: 0.22 K/UL (ref 0–0.44)
EOSINOPHILS RELATIVE PERCENT: 3 % (ref 1–4)
ERYTHROCYTE [DISTWIDTH] IN BLOOD BY AUTOMATED COUNT: 13.5 % (ref 11.8–14.4)
GFR SERPL CREATININE-BSD FRML MDRD: >90 ML/MIN/1.73M2
GLUCOSE SERPL-MCNC: 150 MG/DL (ref 74–99)
HCT VFR BLD AUTO: 50.2 % (ref 40.7–50.3)
HGB BLD-MCNC: 16.6 G/DL (ref 13–17)
IMM GRANULOCYTES # BLD AUTO: 0.04 K/UL (ref 0–0.3)
IMM GRANULOCYTES NFR BLD: 1 %
LYMPHOCYTES NFR BLD: 2.45 K/UL (ref 1.1–3.7)
LYMPHOCYTES RELATIVE PERCENT: 30 % (ref 24–43)
MCH RBC QN AUTO: 30.7 PG (ref 25.2–33.5)
MCHC RBC AUTO-ENTMCNC: 33.1 G/DL (ref 28.4–34.8)
MCV RBC AUTO: 93 FL (ref 82.6–102.9)
MONOCYTES NFR BLD: 0.89 K/UL (ref 0.1–1.2)
MONOCYTES NFR BLD: 11 % (ref 3–12)
NEUTROPHILS NFR BLD: 54 % (ref 36–65)
NEUTS SEG NFR BLD: 4.43 K/UL (ref 1.5–8.1)
NRBC BLD-RTO: 0 PER 100 WBC
PLATELET # BLD AUTO: 271 K/UL (ref 138–453)
PMV BLD AUTO: 10.3 FL (ref 8.1–13.5)
POTASSIUM SERPL-SCNC: 4.6 MMOL/L (ref 3.7–5.3)
RBC # BLD AUTO: 5.4 M/UL (ref 4.21–5.77)
SODIUM SERPL-SCNC: 138 MMOL/L (ref 136–145)
TROPONIN I SERPL HS-MCNC: 14 NG/L (ref 0–22)
WBC OTHER # BLD: 8.1 K/UL (ref 3.5–11.3)

## 2024-04-05 PROCEDURE — 85025 COMPLETE CBC W/AUTO DIFF WBC: CPT

## 2024-04-05 PROCEDURE — 36415 COLL VENOUS BLD VENIPUNCTURE: CPT

## 2024-04-05 PROCEDURE — C1894 INTRO/SHEATH, NON-LASER: HCPCS | Performed by: INTERNAL MEDICINE

## 2024-04-05 PROCEDURE — G0378 HOSPITAL OBSERVATION PER HR: HCPCS

## 2024-04-05 PROCEDURE — 99152 MOD SED SAME PHYS/QHP 5/>YRS: CPT | Performed by: INTERNAL MEDICINE

## 2024-04-05 PROCEDURE — 84484 ASSAY OF TROPONIN QUANT: CPT

## 2024-04-05 PROCEDURE — 6360000004 HC RX CONTRAST MEDICATION: Performed by: INTERNAL MEDICINE

## 2024-04-05 PROCEDURE — 93005 ELECTROCARDIOGRAM TRACING: CPT

## 2024-04-05 PROCEDURE — 2500000003 HC RX 250 WO HCPCS: Performed by: INTERNAL MEDICINE

## 2024-04-05 PROCEDURE — 93458 L HRT ARTERY/VENTRICLE ANGIO: CPT | Performed by: INTERNAL MEDICINE

## 2024-04-05 PROCEDURE — 93010 ELECTROCARDIOGRAM REPORT: CPT | Performed by: INTERNAL MEDICINE

## 2024-04-05 PROCEDURE — 2580000003 HC RX 258

## 2024-04-05 PROCEDURE — 99222 1ST HOSP IP/OBS MODERATE 55: CPT | Performed by: INTERNAL MEDICINE

## 2024-04-05 PROCEDURE — 6360000002 HC RX W HCPCS: Performed by: INTERNAL MEDICINE

## 2024-04-05 PROCEDURE — 2709999900 HC NON-CHARGEABLE SUPPLY: Performed by: INTERNAL MEDICINE

## 2024-04-05 PROCEDURE — 80048 BASIC METABOLIC PNL TOTAL CA: CPT

## 2024-04-05 RX ORDER — HEPARIN SODIUM 1000 [USP'U]/ML
INJECTION, SOLUTION INTRAVENOUS; SUBCUTANEOUS PRN
Status: DISCONTINUED | OUTPATIENT
Start: 2024-04-05 | End: 2024-04-05 | Stop reason: HOSPADM

## 2024-04-05 RX ORDER — ACETAMINOPHEN 325 MG/1
650 TABLET ORAL EVERY 4 HOURS PRN
Status: DISCONTINUED | OUTPATIENT
Start: 2024-04-05 | End: 2024-04-05 | Stop reason: HOSPADM

## 2024-04-05 RX ORDER — SODIUM CHLORIDE 0.9 % (FLUSH) 0.9 %
5-40 SYRINGE (ML) INJECTION PRN
Status: DISCONTINUED | OUTPATIENT
Start: 2024-04-05 | End: 2024-04-05 | Stop reason: HOSPADM

## 2024-04-05 RX ORDER — ASPIRIN 81 MG/1
81 TABLET, CHEWABLE ORAL DAILY
Status: DISCONTINUED | OUTPATIENT
Start: 2024-04-05 | End: 2024-04-05 | Stop reason: HOSPADM

## 2024-04-05 RX ORDER — SODIUM CHLORIDE 0.9 % (FLUSH) 0.9 %
5-40 SYRINGE (ML) INJECTION EVERY 12 HOURS SCHEDULED
Status: DISCONTINUED | OUTPATIENT
Start: 2024-04-05 | End: 2024-04-05 | Stop reason: HOSPADM

## 2024-04-05 RX ORDER — SODIUM CHLORIDE 9 MG/ML
INJECTION, SOLUTION INTRAVENOUS PRN
Status: DISCONTINUED | OUTPATIENT
Start: 2024-04-05 | End: 2024-04-05 | Stop reason: HOSPADM

## 2024-04-05 RX ORDER — MIDAZOLAM HYDROCHLORIDE 1 MG/ML
INJECTION INTRAMUSCULAR; INTRAVENOUS PRN
Status: DISCONTINUED | OUTPATIENT
Start: 2024-04-05 | End: 2024-04-05 | Stop reason: HOSPADM

## 2024-04-05 RX ORDER — DEXTROSE MONOHYDRATE 100 MG/ML
INJECTION, SOLUTION INTRAVENOUS CONTINUOUS PRN
Status: DISCONTINUED | OUTPATIENT
Start: 2024-04-05 | End: 2024-04-05 | Stop reason: HOSPADM

## 2024-04-05 RX ORDER — GLUCAGON 1 MG/ML
1 KIT INJECTION PRN
Status: DISCONTINUED | OUTPATIENT
Start: 2024-04-05 | End: 2024-04-05 | Stop reason: HOSPADM

## 2024-04-05 RX ADMIN — SODIUM CHLORIDE, POTASSIUM CHLORIDE, SODIUM LACTATE AND CALCIUM CHLORIDE: 600; 310; 30; 20 INJECTION, SOLUTION INTRAVENOUS at 00:26

## 2024-04-05 NOTE — CONSULTS
Today's Date: 4/5/2024  Patient Name: Carlos Winter  Date of admission: 4/4/2024 10:23 AM  Patient's age: 58 y.o., 1965  Admission Dx: Chest pain [R07.9]  Chest pain, unspecified type [R07.9]    Reason for Consult:  Cardiac evaluation    Requesting Physician: Rubén Rodas MD    CHIEF COMPLAINT:  Chest pain    History Obtained From:  patient, electronic medical record    HISTORY OF PRESENT ILLNESS:      The patient is a 58 y.o. male with history of hypertension, hyperlipidemia and type 2 diabetes mellitus who recent presented with substernal squeezing chest pain and was admitted to the observation unit with cardiology consulted. Initial troponin have been negative and EKG with no acute ischemic changes      Past Medical History:   has a past medical history of Anxiety, GERD (gastroesophageal reflux disease), Hyperlipidemia, Hypertension, Obesity, Class III, BMI 40-49.9 (morbid obesity) (HCC), and Obstructive sleep apnea.    Past Surgical History:   has a past surgical history that includes Appendectomy; Colonoscopy (08/25/2017); and pr colsc flx w/rmvl of tumor polyp lesion snare tq (N/A, 8/25/2017).     Home Medications:    Prior to Admission medications    Medication Sig Start Date End Date Taking? Authorizing Provider   Continuous Blood Gluc Sensor (FREESTYLE DEBBY 3 SENSOR) [facility] 1 each by Does not apply route every 14 days 4/1/24   Tatum Vanegas APRN - CNP   glipiZIDE (GLUCOTROL XL) 2.5 MG extended release tablet Take 2 tablets by mouth daily for 1 week, then increase to 3 tablets daily. Take with breakfast, do not take on an empty stomach. 2/28/24   Janis Brown MD   Continuous Blood Gluc Sensor (FREESTYLE DEBBY 3 SENSOR) MISC Wear each sensor for 14 days 2/28/24   Janis Brown MD   SITagliptin (JANUVIA) 100 MG tablet TAKE 1 TABLET BY MOUTH EVERY DAY 2/28/24   Janis Brown MD   lisinopril-hydroCHLOROthiazide (PRINZIDE;ZESTORETIC) 10-12.5 MG per tablet Take 1 tablet by

## 2024-04-05 NOTE — DISCHARGE SUMMARY
CDU Discharge Summary        Patient:  Carlos Winter  YOB: 1965    MRN: [id number]   Acct: [id number]    Primary Care Physician: Janis Brown MD    Admit date:  4/4/2024 10:23 AM  Discharge date: 4/5/2024  6:48 PM     Discharge Diagnoses:     Acute chest pain due to acute coronary syndrome  Improved with symptomatic treatment, cardiology consultation, left heart cath    Follow-up:  Call today/tomorrow for a follow up appointment with Janis Brown MD , or return to the Emergency Room with worsening symptoms    Stressed to patient the importance of following up with primary care doctor for further workup/management of symptoms.  Pt verbalizes understanding and agrees with plan.    Discharge Medications:  Changes to medications            Medication List        CONTINUE taking these medications      atorvastatin 40 MG tablet  Commonly known as: LIPITOR  Take 1 tablet by mouth at bedtime     dapagliflozin 10 MG tablet  Commonly known as: Farxiga  Take 1 tablet by mouth every morning     * FreeStyle Gabriel 3 Sensor Misc  Wear each sensor for 14 days     * FreeStyle Gabriel 3 Sensor Misc  1 each by Does not apply route every 14 days     glipiZIDE 2.5 MG extended release tablet  Commonly known as: Glucotrol XL  Take 2 tablets by mouth daily for 1 week, then increase to 3 tablets daily. Take with breakfast, do not take on an empty stomach.     lisinopril-hydroCHLOROthiazide 10-12.5 MG per tablet  Commonly known as: PRINZIDE;ZESTORETIC  Take 1 tablet by mouth daily     pantoprazole 40 MG tablet  Commonly known as: PROTONIX  Take 1 tablet by mouth daily     Respiratory Therapy Supplies Sonia  1 Device by Does not apply route nightly     sertraline 100 MG tablet  Commonly known as: ZOLOFT  Take 1 tablet by mouth daily     SITagliptin 100 MG tablet  Commonly known as: Januvia  TAKE 1 TABLET BY MOUTH EVERY DAY     vitamin D3 25 MCG (1000 UT) Tabs tablet  Generic drug: vitamin D  Take 1 tablet by mouth

## 2024-04-05 NOTE — PROGRESS NOTES
[facility]  CDU / OBSERVATION ENCOUNTER  ATTENDING NOTE       I performed a history and physical examination of the patient and discussed management with the resident or midlevel provider. I reviewed the resident or midlevel provider's note and agree with the documented findings and plan of care. Any areas of disagreement are noted on the chart. I was personally present for the key portions of any procedures. I have documented in the chart those procedures where I was not present during the key portions. I have reviewed the nurses notes. I agree with the chief complaint, past medical history, past surgical history, allergies, medications, social and family history as documented unless otherwise noted below.    The Family history, social history, and ROS are effectively unchanged since admission unless noted elsewhere in the chart.    This patient was placed in the observation unit for reevaluation for possible admission to the hospital    Patient admitted to the ETU for chest pain.  Patient does have a history of hypertension hyperlipidemia and type 2 diabetes.  Patient did have a recent cardiac stress test in March 2024 which showed a perfusion defect and intermediate risk.  Cardiology has been consulted and a cardiac cath is planned for today.    On my exam this morning, patient is resting comfortably in the bed and appears well.  He denies any current chest pain or shortness of breath.  Lungs are clear to auscultation bilaterally and heart sounds are normal.  Will await results of the cardiac cath and any further cardiology recommendations.    Maria Isabel Huitron MD  Attending Emergency  Physician

## 2024-04-05 NOTE — DISCHARGE INSTRUCTIONS
You have been evaluated in the Emergency Department at [facility] 4/5/2024     Your recommendations and if necessary prescriptions from today's visit:   -Take medication as prescribed  -Follow-up with your PCP  -Call to schedule appointment for follow-up with cardiology    Should you have any questions regarding your care or further treatment, please call [facility] Emergency Department at [phone].    PLEASE RETURN TO THE EMERGENCY DEPARTMENT IMMEDIATELY for   -Repeat episodes of chest pain  -Shortness of breath, fevers, chills  -Numbness, tingling, weakness  -Swelling at catheterization site or significant pain  -Changing symptoms  -Worsening symptoms  -Unable to follow up with your primary care provider  -Any other care or concern

## 2024-04-05 NOTE — H&P
OBS/CDU   RESIDENT NOTE      Patients PCP is:  Janis Brown MD        SUBJECTIVE      No acute events overnight. Has been able to tolerate a full diet without nausea or vomiting. The patient is urinating on his own and is passing flatus. Denies fever, chills, nausea, vomiting, chest pain, shortness of breath, abdominal pain, focal weakness, numbness, tingling, urinary/bowel symptoms, vision changes, visual hallucinations, or headache.       PHYSICAL EXAM      General: NAD, AO X 3  Heent: EMOI, PERRL  Neck: SUPPLE, NO JVD  Cardiovascular: RRR, S1S2  Pulmonary: CTAB, NO SOB  Abdomen: SOFT, NTTP, ND, +BS  Extremities: +2/4 PULSES DISTAL, NO SWELLING  Neuro / Psych: NO NUMBNESS OR TINGLING, MENTATION AT BASELINE    PERTINENT TEST /EXAMS      I have reviewed all available laboratory results.    MEDICATIONS CURRENT   glucose chewable tablet 16 g, PRN  dextrose bolus 10% 125 mL, PRN   Or  dextrose bolus 10% 250 mL, PRN  glucagon injection 1 mg, PRN  dextrose 10 % infusion, Continuous PRN  aspirin chewable tablet 81 mg, Daily  sodium chloride flush 0.9 % injection 5-40 mL, 2 times per day  sodium chloride flush 0.9 % injection 5-40 mL, PRN  0.9 % sodium chloride infusion, PRN  acetaminophen (TYLENOL) tablet 650 mg, Q4H PRN  ondansetron (ZOFRAN-ODT) disintegrating tablet 4 mg, Q8H PRN   Or  ondansetron (ZOFRAN) injection 4 mg, Q6H PRN  atorvastatin (LIPITOR) tablet 40 mg, Nightly  pantoprazole (PROTONIX) tablet 40 mg, Daily  sertraline (ZOLOFT) tablet 100 mg, Daily  Vitamin D (CHOLECALCIFEROL) tablet 1,000 Units, Daily  enoxaparin (LOVENOX) injection 40 mg, Daily  insulin lispro (HUMALOG) injection vial 0-16 Units, TID WC  insulin lispro (HUMALOG) injection vial 0-4 Units, Nightly  lactated ringers IV soln infusion, Continuous  lisinopril (PRINIVIL;ZESTRIL) tablet 10 mg, Daily   And  hydroCHLOROthiazide capsule 12.5 mg, Daily        All medication charted and reviewed.    CONSULTS      IP CONSULT TO  CARDIOLOGY    ASSESSMENT/PLAN       Carlos Winter is a 58 y.o. male who presents with persistent intermittent chest pain going on for several months.    Patient with past medical history anxiety, GERD, hyperlipidemia, hypertension, obstructive sleep apnea,, diabetes.    # Chest Pain  -Patient with stress test in early March with indeterminant risk based on reversible perfusion defects at stress  -Cardiology consulted for patient's with additional recommendations for cardiac catheterization  -Dispo pending cardiac catheterization      Continue home medications and pain control  Monitor vitals, labs, and imaging  DISPO: pending consults and clinical improvement    --  Brandon Estrada DO  Emergency Medicine Resident Physician     This dictation was generated by voice recognition computer software.  Although all attempts are made to edit the dictation for accuracy, there may be errors in the transcription that are not intended.

## 2024-04-08 ENCOUNTER — CARE COORDINATION (OUTPATIENT)
Dept: CASE MANAGEMENT | Age: 59
End: 2024-04-08

## 2024-04-08 NOTE — CARE COORDINATION
Care Transitions Outreach Attempt #1  Initial 24 hour call.     Call within 2 business days of discharge: Yes   Attempted to reach patient for transitions of care follow up. Unable to reach patient. HIPAA compliant message left on  requesting a return call.    Patient: Carlos Winter Patient : 1965 MRN: [id number]    Last Discharge Facility       Date Complaint Diagnosis Description Type Department Provider    24 Chest Pain Chest pain, unspecified type ... ED to Hosp-Admission (Discharged) (ADMITTED) [facility] Rubén Rodas MD; Elvis Huitron..              Was this an external facility discharge? No Discharge Facility: [facility]    Noted following upcoming appointments from discharge chart review:   [facility] follow up appointment(s):   Future Appointments   Date Time Provider Department Center   2024  3:30 PM Janis Brown MD [place] [facility]  [id number]      Veronique Bueno LPN  Care Transition Nurse

## 2024-04-09 ENCOUNTER — CARE COORDINATION (OUTPATIENT)
Dept: CASE MANAGEMENT | Age: 59
End: 2024-04-09

## 2024-04-09 NOTE — CARE COORDINATION
Care Transitions Outreach Attempt #2  Initial 24 hour call.     Call within 2 business days of discharge: Yes   Attempted to reach patient for transitions of care follow up. Unable to reach patient. HIPAA compliant message left on  requesting a return call. Noted that patient has PCP follow up scheduled for 2024. Will end care transitions id no return call received.     Patient: Carlos Winter Patient : 1965 MRN: [id number]    Last Discharge Facility       Date Complaint Diagnosis Description Type Department Provider    24 Chest Pain Chest pain, unspecified type ... ED to Hosp-Admission (Discharged) (ADMITTED) [facility] Rubén Rodas MD; Elvis Huitron..              Was this an external facility discharge? No Discharge Facility: [facility]    Noted following upcoming appointments from discharge chart review:   [facility] follow up appointment(s):   Future Appointments   Date Time Provider Department Center   2024  1:00 PM [facility]   2024  3:30 PM [facility]        Veronique Bueno LPN  Care Transition Nurse

## 2024-06-05 ENCOUNTER — HOSPITAL ENCOUNTER (OUTPATIENT)
Age: 59
Setting detail: SPECIMEN
Discharge: HOME | End: 2024-06-05

## 2024-06-05 ENCOUNTER — HOSPITAL ENCOUNTER (OUTPATIENT)
Dept: CT IMAGING | Facility: CLINIC | Age: 59
Discharge: HOME | End: 2024-06-07
Payer: COMMERCIAL

## 2024-06-05 DIAGNOSIS — E11.65 TYPE 2 DIABETES MELLITUS WITH HYPERGLYCEMIA, WITHOUT LONG-TERM CURRENT USE OF INSULIN (HCC): ICD-10-CM

## 2024-06-05 DIAGNOSIS — H53.483 TUNNEL VISUAL FIELD CONSTRICTION OF BOTH EYES: ICD-10-CM

## 2024-06-05 DIAGNOSIS — R42 DIZZINESS: ICD-10-CM

## 2024-06-05 LAB
CHOLEST SERPL-MCNC: 138 MG/DL (ref 0–199)
CHOLESTEROL/HDL RATIO: 3
HDLC SERPL-MCNC: 46 MG/DL
LDLC SERPL CALC-MCNC: 74 MG/DL (ref 0–100)
TRIGL SERPL-MCNC: 92 MG/DL (ref 0–149)
VLDLC SERPL CALC-MCNC: 18 MG/DL

## 2024-06-05 PROCEDURE — 70450 CT HEAD/BRAIN W/O DYE: CPT

## 2024-06-09 PROBLEM — R42 DIZZINESS: Status: ACTIVE | Noted: 2024-06-09

## 2024-06-09 PROBLEM — H53.483 TUNNEL VISUAL FIELD CONSTRICTION OF BOTH EYES: Status: ACTIVE | Noted: 2024-06-09

## 2024-06-09 PROBLEM — H61.23 BILATERAL IMPACTED CERUMEN: Status: ACTIVE | Noted: 2024-06-09

## 2024-06-12 ENCOUNTER — HOSPITAL ENCOUNTER (OUTPATIENT)
Dept: VASCULAR LAB | Age: 59
Discharge: HOME | End: 2024-06-14
Attending: STUDENT IN AN ORGANIZED HEALTH CARE EDUCATION/TRAINING PROGRAM
Payer: COMMERCIAL

## 2024-06-12 DIAGNOSIS — H53.483 TUNNEL VISUAL FIELD CONSTRICTION OF BOTH EYES: ICD-10-CM

## 2024-06-12 DIAGNOSIS — R42 DIZZINESS: ICD-10-CM

## 2024-06-12 LAB
VAS LEFT BULB EDV: 23.8 CM/S
VAS LEFT BULB PSV: 114 CM/S
VAS LEFT CCA DIST EDV: 29.9 CM/S
VAS LEFT CCA DIST PSV: 115 CM/S
VAS LEFT CCA MID EDV: 24.5 CM/S
VAS LEFT CCA MID PSV: 117 CM/S
VAS LEFT CCA PROX EDV: 26.5 CM/S
VAS LEFT CCA PROX PSV: 113 CM/S
VAS LEFT ECA EDV: 15.6 CM/S
VAS LEFT ECA PSV: 96.5 CM/S
VAS LEFT ICA DIST EDV: 26.3 CM/S
VAS LEFT ICA DIST PSV: 61.9 CM/S
VAS LEFT ICA MID EDV: 20.2 CM/S
VAS LEFT ICA MID PSV: 84 CM/S
VAS LEFT ICA PROX EDV: 10.6 CM/S
VAS LEFT ICA PROX PSV: 64.8 CM/S
VAS LEFT VERTEBRAL EDV: 18.8 CM/S
VAS LEFT VERTEBRAL PSV: 50.6 CM/S
VAS RIGHT BULB EDV: 15.6 CM/S
VAS RIGHT BULB PSV: 80.4 CM/S
VAS RIGHT CCA DIST EDV: 25.2 CM/S
VAS RIGHT CCA DIST PSV: 99 CM/S
VAS RIGHT CCA MID EDV: 25.2 CM/S
VAS RIGHT CCA MID PSV: 103 CM/S
VAS RIGHT CCA PROX EDV: 21.6 CM/S
VAS RIGHT CCA PROX PSV: 100 CM/S
VAS RIGHT ECA EDV: 16.8 CM/S
VAS RIGHT ECA PSV: 96 CM/S
VAS RIGHT ICA DIST EDV: 33.1 CM/S
VAS RIGHT ICA DIST PSV: 90.3 CM/S
VAS RIGHT ICA MID EDV: 26.6 CM/S
VAS RIGHT ICA MID PSV: 74.6 CM/S
VAS RIGHT ICA PROX EDV: 16.1 CM/S
VAS RIGHT ICA PROX PSV: 57.9 CM/S
VAS RIGHT VERTEBRAL EDV: 13.4 CM/S
VAS RIGHT VERTEBRAL PSV: 40.8 CM/S

## 2024-06-12 PROCEDURE — 93880 EXTRACRANIAL BILAT STUDY: CPT

## 2025-04-11 ENCOUNTER — HOSPITAL ENCOUNTER (OUTPATIENT)
Age: 60
Setting detail: SPECIMEN
Discharge: HOME | End: 2025-04-11

## 2025-04-11 DIAGNOSIS — E11.9 TYPE 2 DIABETES MELLITUS WITHOUT COMPLICATION, WITHOUT LONG-TERM CURRENT USE OF INSULIN: ICD-10-CM

## 2025-04-11 LAB
ALBUMIN SERPL-MCNC: 4 G/DL (ref 3.5–5.2)
ALBUMIN/GLOB SERPL: 1.5 {RATIO} (ref 1–2.5)
ALP SERPL-CCNC: 87 U/L (ref 40–129)
ALT SERPL-CCNC: 16 U/L (ref 10–50)
ANION GAP SERPL CALCULATED.3IONS-SCNC: 12 MMOL/L (ref 9–16)
AST SERPL-CCNC: 24 U/L (ref 10–50)
BASOPHILS # BLD: 0.03 K/UL (ref 0–0.2)
BASOPHILS NFR BLD: 0 % (ref 0–2)
BILIRUB SERPL-MCNC: 0.7 MG/DL (ref 0–1.2)
BUN SERPL-MCNC: 17 MG/DL (ref 6–20)
CALCIUM SERPL-MCNC: 9.1 MG/DL (ref 8.6–10.4)
CHLORIDE SERPL-SCNC: 104 MMOL/L (ref 98–107)
CHOLEST SERPL-MCNC: 131 MG/DL (ref 0–199)
CHOLESTEROL/HDL RATIO: 3.2
CO2 SERPL-SCNC: 23 MMOL/L (ref 20–31)
CREAT SERPL-MCNC: 0.8 MG/DL (ref 0.7–1.2)
CREAT UR-MCNC: 167 MG/DL (ref 39–259)
EOSINOPHIL # BLD: 0.33 K/UL (ref 0–0.44)
EOSINOPHILS RELATIVE PERCENT: 4 % (ref 1–4)
ERYTHROCYTE [DISTWIDTH] IN BLOOD BY AUTOMATED COUNT: 14.5 % (ref 11.8–14.4)
GFR, ESTIMATED: >90 ML/MIN/1.73M2
GLUCOSE SERPL-MCNC: 151 MG/DL (ref 74–99)
HCT VFR BLD AUTO: 51 % (ref 40.7–50.3)
HDLC SERPL-MCNC: 41 MG/DL
HGB BLD-MCNC: 16.4 G/DL (ref 13–17)
IMM GRANULOCYTES # BLD AUTO: 0.03 K/UL (ref 0–0.3)
IMM GRANULOCYTES NFR BLD: 0 %
LDLC SERPL CALC-MCNC: 75 MG/DL (ref 0–100)
LYMPHOCYTES NFR BLD: 2.34 K/UL (ref 1.1–3.7)
LYMPHOCYTES RELATIVE PERCENT: 26 % (ref 24–43)
MCH RBC QN AUTO: 29.9 PG (ref 25.2–33.5)
MCHC RBC AUTO-ENTMCNC: 32.2 G/DL (ref 28.4–34.8)
MCV RBC AUTO: 93.1 FL (ref 82.6–102.9)
MICROALBUMIN UR-MCNC: <12 MG/L (ref 0–20)
MICROALBUMIN/CREAT UR-RTO: NORMAL MCG/MG CREAT (ref 0–17)
MONOCYTES NFR BLD: 0.91 K/UL (ref 0.1–1.2)
MONOCYTES NFR BLD: 10 % (ref 3–12)
NEUTROPHILS NFR BLD: 60 % (ref 36–65)
NEUTS SEG NFR BLD: 5.47 K/UL (ref 1.5–8.1)
NRBC BLD-RTO: 0 PER 100 WBC
PLATELET # BLD AUTO: 309 K/UL (ref 138–453)
PMV BLD AUTO: 11.9 FL (ref 8.1–13.5)
POTASSIUM SERPL-SCNC: 4.3 MMOL/L (ref 3.7–5.3)
PROT SERPL-MCNC: 6.6 G/DL (ref 6.6–8.7)
RBC # BLD AUTO: 5.48 M/UL (ref 4.21–5.77)
RBC # BLD: ABNORMAL 10*6/UL
SODIUM SERPL-SCNC: 139 MMOL/L (ref 136–145)
TRIGL SERPL-MCNC: 77 MG/DL (ref 0–149)
VLDLC SERPL CALC-MCNC: 15 MG/DL (ref 1–30)
WBC OTHER # BLD: 9.1 K/UL (ref 3.5–11.3)

## 2025-08-02 ENCOUNTER — OFFICE VISIT (OUTPATIENT)
Dept: FAMILY MEDICINE CLINIC | Age: 60
End: 2025-08-02

## 2025-08-02 VITALS
HEART RATE: 78 BPM | HEIGHT: 72 IN | SYSTOLIC BLOOD PRESSURE: 126 MMHG | DIASTOLIC BLOOD PRESSURE: 86 MMHG | WEIGHT: 306 LBS | BODY MASS INDEX: 41.45 KG/M2 | TEMPERATURE: 97 F | OXYGEN SATURATION: 95 %

## 2025-08-02 DIAGNOSIS — L30.9 DERMATITIS: Primary | ICD-10-CM

## 2025-08-02 RX ORDER — CLOBETASOL PROPIONATE 0.5 MG/G
OINTMENT TOPICAL
Qty: 45 G | Refills: 0 | Status: SHIPPED | OUTPATIENT
Start: 2025-08-02 | End: 2025-08-16

## 2025-08-02 RX ORDER — HYDROXYZINE HYDROCHLORIDE 25 MG/1
25 TABLET, FILM COATED ORAL EVERY 8 HOURS PRN
Qty: 15 TABLET | Refills: 0 | Status: SHIPPED | OUTPATIENT
Start: 2025-08-02 | End: 2025-08-12

## 2025-08-02 RX ORDER — TRIAMCINOLONE ACETONIDE 40 MG/ML
40 INJECTION, SUSPENSION INTRA-ARTICULAR; INTRAMUSCULAR ONCE
Status: COMPLETED | OUTPATIENT
Start: 2025-08-02 | End: 2025-08-02

## 2025-08-02 RX ADMIN — TRIAMCINOLONE ACETONIDE 40 MG: 40 INJECTION, SUSPENSION INTRA-ARTICULAR; INTRAMUSCULAR at 11:33

## 2025-08-02 NOTE — PROGRESS NOTES
Freda Schultz MD  [facility], [facility]  [id number]   [address]  [facility] [id number]  Dept: [phone]    Carlos Winter is a 59 y.o. male who presents today for their medical conditions/complaints as noted below.  Carlos Winter is here today c/o Rash (Bilateral itchy feet working it's way up x3 weeks getting worse over time. )       HPI:     HPI    Patient presents to the walk-in clinic for evaluation of skin irritation at bilateral lower extremities that began a month ago  Symptoms began with redness and itching between the first 2 toes of the right foot, the itchy red rash has then spread all along his dorsal right foot and distal right lower leg, in the past few week the dorsal left foot has become involved as well  Seen by his PCP 7/24, diagnosed with tinea pedis, prescribed nystatin ointment and miconazole powder which has not been helpful at all  Last night took some Benadryl which did help the itching somewhat  Some clear discharge from the rash area  Denies any new exposures, no change in soaps, shampoos, cleansers, detergents, clothing, medications, travel, food  T2DM, no lower extremity neuropathy, no numbness in the legs  Denies fever  Had a similar appearing rash at the lower legs 10/23/2023, after seeing multiple providers he finally had success with triamcinolone injection and topical clobetasol    Patient Active Problem List   Diagnosis    HTN (hypertension)    GERD (gastroesophageal reflux disease)    Anxiety    Obesity, Class III, BMI 40-49.9 (morbid obesity) (HCC)    Tobacco use    Hyperlipidemia    Rectal bleeding    Slow transit constipation    Vitamin D deficiency    WING on CPAP    Acute cellulitis    Poison ivy    Type 2 diabetes mellitus without complication, without long-term current use of insulin (HCC)    Type 2 diabetes mellitus    Angina pectoris    Type 2 diabetes mellitus with hyperglycemia    ACS

## (undated) DEVICE — POLYP TRAP: Brand: TRAPEASE®

## (undated) DEVICE — GLOVE ORANGE PI 7   MSG9070

## (undated) DEVICE — JELLY,LUBE,STERILE,FLIP TOP,TUBE,2-OZ: Brand: MEDLINE

## (undated) DEVICE — SNARE ENDOSCP L240CM LOOP W13MM DIA2.4MM SHT THROW SM OVL

## (undated) DEVICE — GLIDESHEATH SLENDER STAINLESS STEEL KIT: Brand: GLIDESHEATH SLENDER

## (undated) DEVICE — SURGICAL PROCEDURE TRAY CRD CATH SVMMC

## (undated) DEVICE — DEVICE COMPR LNG 27 CM VASC BND

## (undated) DEVICE — AIRLIFE™ NASAL OXYGEN CANNULA CURVED, FLARED TIP, WITH 7 FEET (2.1 M) CRUSH RESISTANT TUBING, OVER-THE-EAR STYLE: Brand: AIRLIFE™

## (undated) DEVICE — DEFENDO AIR WATER SUCTION AND BIOPSY VALVE KIT FOR  OLYMPUS: Brand: DEFENDO AIR/WATER/SUCTION AND BIOPSY VALVE

## (undated) DEVICE — ANGIOGRAPHIC CATHETER: Brand: EXPO™